# Patient Record
Sex: FEMALE | Race: WHITE | Employment: OTHER | URBAN - NONMETROPOLITAN AREA
[De-identification: names, ages, dates, MRNs, and addresses within clinical notes are randomized per-mention and may not be internally consistent; named-entity substitution may affect disease eponyms.]

---

## 2017-10-26 ENCOUNTER — HOSPITAL ENCOUNTER (OUTPATIENT)
Dept: PHYSICAL THERAPY | Age: 82
Setting detail: THERAPIES SERIES
Discharge: HOME | End: 2017-10-26
Payer: MEDICARE

## 2017-10-26 PROCEDURE — G8987 SELF CARE CURRENT STATUS: HCPCS

## 2017-10-26 PROCEDURE — G8988 SELF CARE GOAL STATUS: HCPCS

## 2017-10-26 PROCEDURE — 97162 PT EVAL MOD COMPLEX 30 MIN: CPT

## 2017-10-26 PROCEDURE — 97140 MANUAL THERAPY 1/> REGIONS: CPT

## 2017-10-27 PROCEDURE — G8988 SELF CARE GOAL STATUS: HCPCS

## 2017-10-27 PROCEDURE — G8987 SELF CARE CURRENT STATUS: HCPCS

## 2017-10-27 NOTE — PROGRESS NOTES
Phone: [address]          Fax: [phone]                      Outpatient Physical Therapy                                                                      Evaluation  Date: 10/27/2017  Patient: Manny Beebe  : 3/6/1932  [facility] #: [id number]  Referring Practitioner: Dr. Nancy Santos     Referral Date : 10/10/17     Diagnosis: R UE lymphedema     Treatment Diagnosis: R UE lymphedema   Onset Date: 10/10/17  PT Insurance Information: Medicare   Total # of Visits Approved: 12   Total # of Visits to Date: 1  No Show: 0  Canceled Appointment: 0     Subjective  Subjective: Pt reports she has breast cancer in her R breast in . PT states that removed 2 lymphnodes at the time. PT reports she slowly started noticing her R arm was getting bigger and started to go to a wound clinic. Pt is wearing a compresson garement and has a pump on it's way. Additional Pertinent Hx: breast cancer 2015, OA,   Pain Screening  Patient Currently in Pain: Denies          Objective     Observation/Palpation  Edema: R hand measurements pinky 4.8cm, 2nd 5.5, 3rd 5.6, 4th 6.2, thumb 5.7 wrist 16.3, 3\" above 19, 6in above 25.4, elbow 27.4, 3\" above 23, armpit 28.5  L hand pinky 4.5, 2nd 5.5, 3rd 5.5, 4th 6.0, thumb 5.8, wrist 15.5, 3\" above 17. 6\" above 21.2, elbow 22.4, 3\" above 25.5, armpit 27.2        Assessment  Body structures, Functions, Activity limitations: Decreased strength, Decreased ROM  Assessment: Pt is an 80year old female that has a history of R sided breast cancer in  with 2 lymphnode removal. Pt shows lymphedema with most significant measure around her elbow. Pt will benefit from the dynamic MDL approach along with compression. Prognosis: Good        Decision Making: Medium Complexity    Patient Education  Pt educated on her POC   Pt verbalized/demonstrated good understanding:     [x] Yes         [] No, pt required further clarification.       [x] Primary

## 2017-10-27 NOTE — PLAN OF CARE
[facility]           Phone: [phone]             Outpatient Physical Therapy  Fax: [phone]                                           Date: 10/27/2017  Patient: Benito Nolasco : 3/6/1932 CSN #: [id number]   Referring Practitioner:  Dr. Edi Blackwood  Referral Date:  10/10/17       [x] Plan of Care   [] Updated Plan of Care    Dates of Service to Include: 10/26/2017 to 17    Diagnosis:  R UE lymphedema     Rehab (Treatment) Diagnosis:  R UE lymphedema              Onset Date:  10/10/17    Attendance  Total # of Visits to Date: 1 No Show: 0 Canceled Appointment: 0    Assessment  Body structures, Functions, Activity limitations: Decreased strength, Decreased ROM  Assessment: Pt is an 80year old female that has a history of R sided breast cancer in  with 2 lymphnode removal. Pt shows lymphedema with most significant measure around her elbow. Pt will benefit from the dynamic MDL approach along with compression.      [x] Primary Impairment :  G Code:    [] Mobility         [] Carry        [] Body Position       [x] Self Care      [] Other:   Functional Impairment Current:  [] 0%    [x] 1-19% [] 20-39% [] 40-59% [] 60-79%    [] 80-99% [] 100%  Functional Impairment Goal:  [x] 0%    [] 1-19% [] 20-39% [] 40-59% [] 60-79%    [] 80-99% [] 100%  G Code Functional Impairment determined by:  [x] Clinical Judgment   [] Outcome Measure:     Goals  Short term goals  Time Frame for Short term goals: 2 weeks  Short term goal 1: Pt will be educated on her PCO and HEP-met  Short term goal 2: Pt will be educated on compression garament wear-met  Long term goals  Time Frame for Long term goals : 4 weeks  Long term goal 1: Pt will be independent with MLD  Long term goal 2: Pt will demonstrate a decrease in lymphedmea by 2cm in her forearm and elbow  Long term goal 3: Pt will be able to maintain lymphedema independently  Long term goal 4: Pt will be fitted for costume garament sleeve     Prognosis  Prognosis: Good    Treatment Plan   Times per week: 4x/wk   Plan weeks: 4 weeks  []HP/CP      []Electrical Stim   []Therapeutic Exercise      []Gait Training  []Aquatics   []Ultrasound         [x]Patient Education/HEP   [x]Manual Therapy  []Traction    []Neuro-obed        []Soft Tissue Mobs            []Home TENS  []Iontophoresis    []Orthotic casting/fitting      [x]MLD           Electronically signed by: Pascual Holden PT, DPT    Date: 10/27/2017      ______________________________________ Date: 10/27/2017   Physician Signature

## 2017-10-30 ENCOUNTER — HOSPITAL ENCOUNTER (OUTPATIENT)
Dept: PHYSICAL THERAPY | Age: 82
Setting detail: THERAPIES SERIES
Discharge: HOME | End: 2017-10-30
Payer: MEDICARE

## 2017-10-30 PROCEDURE — 97140 MANUAL THERAPY 1/> REGIONS: CPT

## 2017-10-30 NOTE — PROGRESS NOTES
Phone: Arleen           Fax: [phone]                           Outpatient Physical Therapy                                                                            Daily Note    Patient: Reyna Geiger : 3/6/1932  CSN #: [id number]   Referring Practitioner:  Dr. Day Jameson     Referral Date : 10/10/17     Date: 10/30/2017    Diagnosis: R UE lymphedema   Treatment Diagnosis: R UE lymphedema     Onset Date: 10/10/17  PT Insurance Information: Medicare   Total # of Visits Approved: 12 Per Physician Order  Total # of Visits to Date: 2  No Show: 0  Canceled Appointment: 0      Pre-Treatment Pain: 0/10  Subjective: Pt reports she has been wearing her sleeve at night and the edema seems to be getting better. Pt brought her daughter to learn the self drainage         Manual:     Soft Tissue Mobalization: MLD      Assessment  Assessment: First session of 40 minutes with MLD and self MLD education. Both patient and daughter was instructed on the technique of self massage. Daughter had a better understanding than patient and stated she would be there to help her. Patient Education  Pt and daughter educated on self MLD  Pt verbalized/demonstrated good understanding:     [] Yes         [] No, pt required further clarification.     Post Treatment Pain:  0/10      Plan  Times per week: 4x/wk   Plan weeks: 4 weeks      Goals  (Total # of Visits to Date: 2)   Short Term Goals - Time Frame for Short term goals: 2 weeks    Short term goal 1: Pt will be educated on her PCO and HEP-met                                        []Met  []Partially met  []Not met   Short term goal 2: Pt will be educated on compression garament wear-met  []Met   []Partially met  []Not met      []Met   []Partially met  []Not met      []Met   []Partially met  []Not met     Long Term Goals - Time Frame for Long term goals : 4 weeks  Long term goal 1: Pt will be independent with MLD []Met  []Partially

## 2017-10-31 ENCOUNTER — HOSPITAL ENCOUNTER (OUTPATIENT)
Dept: PHYSICAL THERAPY | Age: 82
Setting detail: THERAPIES SERIES
Discharge: HOME | End: 2017-10-31
Payer: MEDICARE

## 2017-10-31 PROCEDURE — 97140 MANUAL THERAPY 1/> REGIONS: CPT

## 2017-10-31 NOTE — PROGRESS NOTES
MLD []Met  []Partially met  []Not met   Long term goal 2: Pt will demonstrate a decrease in lymphedmea by 2cm in her forearm and elbow []Met  []Partially met  []Not met   Long term goal 3: Pt will be able to maintain lymphedema independently []Met  []Partially met  []Not met   Long term goal 4: Pt will be fitted for costume garament sleeve  []Met  []Partially met  []Not met     []Met  []Partially met  []Not met       Minutes Tracking:  Time In: 1050  Time Out: [place] 36  Minutes: [address] [facility] DPT, PT Date: 10/31/2017

## 2017-11-01 ENCOUNTER — HOSPITAL ENCOUNTER (OUTPATIENT)
Dept: PHYSICAL THERAPY | Age: 82
Setting detail: THERAPIES SERIES
Discharge: HOME | End: 2017-11-01
Payer: MEDICARE

## 2017-11-01 PROCEDURE — 97140 MANUAL THERAPY 1/> REGIONS: CPT

## 2017-11-01 NOTE — PROGRESS NOTES
Phone: Arleen           Fax: [phone]                           Outpatient Physical Therapy                                                                            Daily Note    Patient: Matilde Mccray : 3/6/1932  [facility] #: [id number]   Referring Practitioner:  Dr. Rachel Liao     Referral Date : 10/10/17     Date: 2017    Diagnosis: R UE lymphedema   Treatment Diagnosis: R UE lymphedema     Onset Date: 10/10/17  PT Insurance Information: Medicare   Total # of Visits Approved: 12 Per Physician Order  Total # of Visits to Date: 4  No Show: 0  Canceled Appointment: 0      Pre-Treatment Pain:  0/10  Subjective: Pt reports she feels that it's looking better. Pt states that her compression pumps are coming in tomorrow. Manual:   Soft Tissue Mobalization: MLD    Assessment  Assessment: Measurements weren't taken today but overall the lymphedema looks like it has decreased especially in her elbow. Pt was instructed and return demonstrated self drainage. Patient Education  Demonstrated self drainage technique   Pt verbalized/demonstrated good understanding:     [x] Yes         [] No, pt required further clarification.     Post Treatment Pain:  0/10      Plan  Times per week: 4x/wk   Plan weeks: 4 weeks      Goals  (Total # of Visits to Date: 4)   Short Term Goals - Time Frame for Short term goals: 2 weeks   Short term goal 1: Pt will be educated on her PCO and HEP-met                                        []Met   []Partially met  []Not met   Short term goal 2: Pt will be educated on compression garament wear-met  []Met   []Partially met  []Not met      []Met   []Partially met  []Not met      []Met   []Partially met  []Not met     Long Term Goals - Time Frame for Long term goals : 4 weeks  Long term goal 1: Pt will be independent with MLD-progressing  []Met  []Partially met  []Not met   Long term goal 2: Pt will demonstrate a decrease in lymphedmea by 2cm

## 2017-11-03 ENCOUNTER — HOSPITAL ENCOUNTER (OUTPATIENT)
Dept: PHYSICAL THERAPY | Age: 82
Setting detail: THERAPIES SERIES
Discharge: HOME | End: 2017-11-03
Payer: MEDICARE

## 2017-11-03 PROCEDURE — 97140 MANUAL THERAPY 1/> REGIONS: CPT

## 2017-11-03 NOTE — PROGRESS NOTES
Phone: Arleen           Fax: [phone]                           Outpatient Physical Therapy                                                                            Daily Note    Patient: May Teixeira : 3/6/1932  [facility] #: [id number]   Referring Practitioner:  Dr. Etienne Coy     Referral Date : 10/10/17     Date: 11/3/2017    Diagnosis: R UE lymphedema   Treatment Diagnosis: R UE lymphedema     Onset Date: 10/10/17  PT Insurance Information: Medicare   Total # of Visits Approved: 12 Per Physician Order  Total # of Visits to Date: 5  No Show: 0  Canceled Appointment: 0      Pre-Treatment Pain: 0/10  Subjective: Pt reports she got her compression pump yesterday and she was able to do it for 2 hours and also attmepted the self MLD      Manual:     Soft Tissue Mobalization: MLD      Assessment  Assessment: Measure are as follows pinky 5.1cm, 2nd finger 5.5, 3rd 5.8, 4th 6.2, thumb 6.0, wrist 16.5, 3\" above 19, 6\" above 24.6, elbow 26.7, 3\"27.8, armpit 29. Pt will be fitted for a compression sleeve the end of next week     Patient Education  Pt educated on compression sleeve fitting   Pt verbalized/demonstrated good understanding:     [x] Yes         [] No, pt required further clarification.     Post Treatment Pain: 0/10      Plan  Times per week: 4x/wk   Plan weeks: 4 weeks      Goals  (Total # of Visits to Date: 5)   Short Term Goals - Time Frame for Short term goals: 2 weeks  Short term goal 1: Pt will be educated on her PCO and HEP-met                                        []Met  []Partially met  []Not met   Short term goal 2: Pt will be educated on compression garament wear-met  []Met  []Partially met  []Not met      []Met   []Partially met  []Not met      []Met   []Partially met  []Not met     Long Term Goals - Time Frame for Long term goals : 4 weeks  Long term goal 1: Pt will be independent with MLD-progressing  []Met  []Partially met  []Not met   Long term goal 2: Pt will demonstrate a decrease in lymphedmea by 2cm in her forearm and elbow []Met  []Partially met  []Not met   Long term goal 3: Pt will be able to maintain lymphedema independently []Met  []Partially met  []Not met   Long term goal 4: Pt will be fitted for costume garament sleeve  []Met  []Partially met  []Not met     []Met  []Partially met  []Not met       Minutes Tracking:  Time In: 1310  Time Out: Kumar 73  Minutes: Hong [address] DPT, PT Date: 11/3/2017

## 2017-11-06 ENCOUNTER — HOSPITAL ENCOUNTER (OUTPATIENT)
Dept: PHYSICAL THERAPY | Age: 82
Setting detail: THERAPIES SERIES
Discharge: HOME | End: 2017-11-06
Payer: MEDICARE

## 2017-11-06 PROCEDURE — 97140 MANUAL THERAPY 1/> REGIONS: CPT

## 2017-11-06 NOTE — PROGRESS NOTES
Phone: Arleen           Fax: [phone]                           Outpatient Physical Therapy                                                                            Daily Note    Patient: Efe Doherty : 3/6/1932  [facility] #: [id number]   Referring Practitioner:  Dr. Criselda Chanel     Referral Date : 10/10/17     Date: 2017    Diagnosis: R UE lymphedema   Treatment Diagnosis: R UE lymphedema     Onset Date: 10/10/17  PT Insurance Information: Medicare   Total # of Visits Approved: 12 Per Physician Order  Total # of Visits to Date: 6  No Show: 0  Canceled Appointment: 0      Pre-Treatment Pain:  0/10  Subjective: Pt reported she used her pump 2x Saturday and 3x . Pt states her shirts and sweaters are starting to fit better. Manual:   Soft Tissue Mobalization: MLD        Assessment  Assessment: Continue with MLd to R UE. Pt has had an overal decrease in extremity size. Pt is independent with her home pump and has been compliant with wearing a generic compression sleeve. Pt will be fitted for a costume sleeve this week. Patient Education  Reviewed self technique   Pt verbalized/demonstrated good understanding:     [x] Yes         [] No, pt required further clarification.     Post Treatment Pain:  0/10      Plan  Times per week: 4x/wk   Plan weeks: 4 weeks      Goals  (Total # of Visits to Date: 6)   Short Term Goals - Time Frame for Short term goals: 2 weeks     Short term goal 1: Pt will be educated on her PCO and HEP-met                                        []Met   []Partially met  []Not met   Short term goal 2: Pt will be educated on compression garament wear-met  []Met   []Partially met  []Not met      []Met   []Partially met  []Not met      []Met   []Partially met  []Not met     Long Term Goals - Time Frame for Long term goals : 4 weeks  Long term goal 1: Pt will be independent with MLD-progressing  []Met  []Partially met  []Not met   Long

## 2017-11-07 ENCOUNTER — HOSPITAL ENCOUNTER (OUTPATIENT)
Dept: PHYSICAL THERAPY | Age: 82
Setting detail: THERAPIES SERIES
Discharge: HOME | End: 2017-11-07
Payer: MEDICARE

## 2017-11-07 ENCOUNTER — APPOINTMENT (OUTPATIENT)
Dept: PHYSICAL THERAPY | Age: 82
End: 2017-11-07
Payer: MEDICARE

## 2017-11-07 PROCEDURE — 97140 MANUAL THERAPY 1/> REGIONS: CPT

## 2017-11-07 NOTE — PROGRESS NOTES
Phone: Arleen           Fax: [phone]                           Outpatient Physical Therapy                                                                            Daily Note    Patient: Manny Beebe : 3/6/1932  [facility] #: [id number]   Referring Practitioner:  Dr. Nancy Santos     Referral Date : 10/10/17     Date: 2017    Diagnosis: R UE lymphedema   Treatment Diagnosis: R UE lymphedema     Onset Date: 10/10/17  PT Insurance Information: Medicare   Total # of Visits Approved: 12 Per Physician Order  Total # of Visits to Date: 7  No Show: 0  Canceled Appointment: 0      Pre-Treatment Pain:  0/10  Subjective: Pt reports she is still having trouble with her self drainage        Manual:   Soft Tissue Mobalization: MLD      Assessment  Body structures, Functions, Activity limitations: Decreased strength, Decreased ROM  Assessment: Modified MLD was performed today due to time constraint. Pt continues to need reinforcement for self MLD    Patient Education  Reviewed POC   Pt verbalized/demonstrated good understanding:     [x] Yes         [] No, pt required further clarification.     Post Treatment Pain: 0/10      Plan  Times per week: 4x/wk   Plan weeks: 4 weeks      Goals  (Total # of Visits to Date: 7)   Short Term Goals - Time Frame for Short term goals: 2 weeks   Short term goal 1: Pt will be educated on her PCO and HEP-met                                        []Met   []Partially met  []Not met   Short term goal 2: Pt will be educated on compression garament wear-met  []Met   []Partially met  []Not met      []Met   []Partially met  []Not met      []Met   []Partially met  []Not met     Long Term Goals - Time Frame for Long term goals : 4 weeks  Long term goal 1: Pt will be independent with MLD-progressing  []Met  []Partially met  []Not met   Long term goal 2: Pt will demonstrate a decrease in lymphedmea by 2cm in her forearm and elbow-progressing []Met  []Partially met  []Not met   Long term goal 3: Pt will be able to maintain lymphedema independently-progressing  []Met  []Partially met  []Not met   Long term goal 4: Pt will be fitted for costume garament sleeve  []Met  []Partially met  []Not met     []Met  []Partially met  []Not met       Minutes Tracking:  Time In: 0730  Time Out: 0800  Minutes: 4100 Sanjay MYERS DPT, PT Date: 11/7/2017

## 2017-11-08 ENCOUNTER — HOSPITAL ENCOUNTER (OUTPATIENT)
Dept: PHYSICAL THERAPY | Age: 82
Setting detail: THERAPIES SERIES
Discharge: HOME | End: 2017-11-08
Payer: MEDICARE

## 2017-11-08 PROCEDURE — 97140 MANUAL THERAPY 1/> REGIONS: CPT

## 2017-11-10 ENCOUNTER — HOSPITAL ENCOUNTER (OUTPATIENT)
Dept: PHYSICAL THERAPY | Age: 82
Setting detail: THERAPIES SERIES
Discharge: HOME | End: 2017-11-10
Payer: MEDICARE

## 2017-11-10 PROCEDURE — 97140 MANUAL THERAPY 1/> REGIONS: CPT

## 2017-11-10 NOTE — PROGRESS NOTES
Phone: Arleen           Fax: [phone]                           Outpatient Physical Therapy                                                                            Daily Note    Patient: May Teixeira : 3/6/1932  [facility] #: [id number]   Referring Practitioner:  Dr. Etienne Coy     Referral Date : 10/10/17     Date: 11/10/2017    Diagnosis: R UE lymphedema   Treatment Diagnosis: R UE lymphedema     Onset Date: 10/10/17  PT Insurance Information: Medicare   Total # of Visits Approved: 12 Per Physician Order  Total # of Visits to Date: 9  No Show: 0  Canceled Appointment: 0      Pre-Treatment Pain:  0/10  Subjective: Pt reported her pump is going well at home         Manual:   Soft Tissue Mobalization: MLD        Assessment  Assessment: Measurements this date wrist 16cm, #\" above 18.6, 6\" above 22.4cm, elbow 26, 3\" above 26.5 armpit 28.8cm     Patient Education  Pt reviewed   Pt verbalized/demonstrated good understanding:     [x] Yes         [] No, pt required further clarification.     Post Treatment Pain:  0/10      Plan  Times per week: 4x/wk   Plan weeks: 4 weeks      Goals  (Total # of Visits to Date: 5)   Short Term Goals - Time Frame for Short term goals: 2 weeks   Short term goal 1: Pt will be educated on her PCO and HEP-met                                        []Met   []Partially met  []Not met   Short term goal 2: Pt will be educated on compression garament wear-met  []Met   []Partially met  []Not met      []Met   []Partially met  []Not met      []Met  []Partially met  []Not met     Long Term Goals - Time Frame for Long term goals : 4 weeks  Long term goal 1: Pt will be independent with MLD-progressing  []Met  []Partially met  []Not met   Long term goal 2: Pt will demonstrate a decrease in lymphedmea by 2cm in her forearm and elbow-progressing  []Met  []Partially met  []Not met   Long term goal 3: Pt will be able to maintain lymphedema independently-progressing  []Met  []Partially met  []Not met   Long term goal 4: Pt will be fitted for costume garament sleeve  []Met  []Partially met  []Not met     []Met  []Partially met  []Not met       Minutes Tracking:  Time In: 1400  Time Out: [address]  Minutes: [address] [facility] DPT, PT Date: 11/10/2017

## 2017-11-13 ENCOUNTER — HOSPITAL ENCOUNTER (OUTPATIENT)
Dept: PHYSICAL THERAPY | Age: 82
Setting detail: THERAPIES SERIES
Discharge: HOME | End: 2017-11-13
Payer: MEDICARE

## 2017-11-14 ENCOUNTER — HOSPITAL ENCOUNTER (OUTPATIENT)
Dept: PHYSICAL THERAPY | Age: 82
Setting detail: THERAPIES SERIES
Discharge: HOME | End: 2017-11-14
Payer: MEDICARE

## 2017-11-14 PROCEDURE — 97140 MANUAL THERAPY 1/> REGIONS: CPT

## 2017-11-14 PROCEDURE — G8988 SELF CARE GOAL STATUS: HCPCS

## 2017-11-14 PROCEDURE — G8987 SELF CARE CURRENT STATUS: HCPCS

## 2017-11-14 NOTE — PROGRESS NOTES
Phone: Arleen           Fax: [phone]                           Outpatient Physical Therapy                                                                            Daily Note    Patient: Matilde Mccray : 3/6/1932  [facility] #: [id number]   Referring Practitioner:  Dr. Rachel Liao     Referral Date : 10/10/17     Date: 2017    Diagnosis: R UE lymphedema   Treatment Diagnosis: R UE lymphedema     Onset Date: 10/10/17  PT Insurance Information: Medicare   Total # of Visits Approved: 12 Per Physician Order  Total # of Visits to Date: 10  No Show: 0  Canceled Appointment: 0      Pre-Treatment Pain:  0/10  Subjective: Pt reported she saw the wound care doctor today and he released her and stated her arm was looking good        Manual:      Soft Tissue Mobalization: MLD        Assessment  Assessment: Pt was released fro her wound car doctor. Pt will continue with MLD until plateau. Measurements wrist 16.6, 3\" above 18.5, 6\" above 22.5, elbow 25.5, 3\" above 26.5, armpit 27.8     Patient Education  PT watched and reviewed technique of self MLD  Pt verbalized/demonstrated good understanding:     [x] Yes         [] No, pt required further clarification.     Post Treatment Pain:  0/10      Plan  Times per week: 4x/wk   Plan weeks: 4 weeks      Goals  (Total # of Visits to Date: 8)   Short Term Goals - Time Frame for Short term goals: 2 weeks    Short term goal 1: Pt will be educated on her PCO and HEP-met                                        []Met   []Partially met  []Not met   Short term goal 2: Pt will be educated on compression garament wear-met  []Met   []Partially met  []Not met      []Met   []Partially met  []Not met      []Met   []Partially met  []Not met     Long Term Goals - Time Frame for Long term goals : 4 weeks  Long term goal 1: Pt will be independent with MLD-progressing  []Met  []Partially met  []Not met   Long term goal 2: Pt will demonstrate a

## 2017-11-15 ENCOUNTER — HOSPITAL ENCOUNTER (OUTPATIENT)
Dept: PHYSICAL THERAPY | Age: 82
Setting detail: THERAPIES SERIES
Discharge: HOME | End: 2017-11-15
Payer: MEDICARE

## 2017-11-15 PROCEDURE — 97140 MANUAL THERAPY 1/> REGIONS: CPT

## 2017-11-15 NOTE — PROGRESS NOTES
Phone: Arleen           Fax: [phone]                           Outpatient Physical Therapy                                                                            Daily Note    Patient: Sanjay Rizvi : 3/6/1932  [facility] #: [id number]   Referring Practitioner:  Dr. Brittnee Matt     Referral Date : 10/10/17     Date: 11/15/2017    Diagnosis: R UE lymphedema   Treatment Diagnosis: R UE lymphedema     Onset Date: 10/10/17  PT Insurance Information: Medicare   Total # of Visits Approved: 12 Per Physician Order  Total # of Visits to Date: 11  No Show: 0  Canceled Appointment: 0      Pre-Treatment Pain:  0/10  Subjective: No complaints         Manual:     Soft Tissue Mobalization: MLD      Assessment  Assessment: Pt will be fitted for a compression sleeve next visit and continue 2x/wk until sleeve arrives     Patient Education  Sleeve fitting   Pt verbalized/demonstrated good understanding:     [x] Yes         [] No, pt required further clarification.     Post Treatment Pain:  0/10      Plan  Times per week: 4x/wk   Plan weeks: 4 weeks      Goals  (Total # of Visits to Date: 6)   Short Term Goals - Time Frame for Short term goals: 2 weeks    Short term goal 1: Pt will be educated on her PCO and HEP-met                                        []Met  []Partially met  []Not met   Short term goal 2: Pt will be educated on compression garament wear-met  []Met   []Partially met  []Not met      []Met   []Partially met  []Not met      []Met   []Partially met  []Not met     Long Term Goals - Time Frame for Long term goals : 4 weeks  Long term goal 1: Pt will be independent with MLD-progressing  []Met  []Partially met  []Not met   Long term goal 2: Pt will demonstrate a decrease in lymphedmea by 2cm in her forearm and elbow-progressing  []Met  []Partially met  []Not met   Long term goal 3: Pt will be able to maintain lymphedema independently-progressing  []Met  []Partially met  []Not met   Long term goal 4: Pt will be fitted for costume garament sleeve  []Met  []Partially met  []Not met     []Met  []Partially met  []Not met       Minutes Tracking:  Time In: 1340  Time Out: [address]  Minutes: 601 [facility] DPT, PT Date: 11/15/2017

## 2017-11-17 ENCOUNTER — HOSPITAL ENCOUNTER (OUTPATIENT)
Dept: PHYSICAL THERAPY | Age: 82
Setting detail: THERAPIES SERIES
Discharge: HOME | End: 2017-11-17
Payer: MEDICARE

## 2017-11-17 PROCEDURE — 97140 MANUAL THERAPY 1/> REGIONS: CPT

## 2017-11-17 NOTE — PLAN OF CARE
for costume garament sleeve-met    Prognosis  Prognosis: Good    Treatment Plan   Times per week: 2x/wk  Plan weeks: 4 weeks  []HP/CP      []Electrical Stim   []Therapeutic Exercise      []Gait Training  []Aquatics   []Ultrasound         [x]Patient Education/HEP   [x]Manual Therapy  []Traction    []Neuro-obed        []Soft Tissue Mobs            []Home TENS  []Iontophoresis    []Orthotic casting/fitting      []Dry Needling             Electronically signed by: Danial Casper PT, DPT    Date: 11/17/2017      ______________________________________ Date: 11/17/2017   Physician Signature

## 2017-11-20 ENCOUNTER — HOSPITAL ENCOUNTER (OUTPATIENT)
Dept: PHYSICAL THERAPY | Age: 82
Setting detail: THERAPIES SERIES
Discharge: HOME | End: 2017-11-20
Payer: MEDICARE

## 2017-11-20 PROCEDURE — 97140 MANUAL THERAPY 1/> REGIONS: CPT

## 2017-11-20 NOTE — PROGRESS NOTES
lymphedema independently-progressing  []Met  []Partially met  []Not met   Long term goal 4: Pt will be fitted for costume garament sleeve-met []Met  []Partially met  []Not met     []Met  []Partially met  []Not met       Minutes Tracking:  Time In: 1000  Time Out: [place]  Minutes: 25    Marina MARIAT,PT Date: 11/20/2017

## 2017-11-22 ENCOUNTER — HOSPITAL ENCOUNTER (OUTPATIENT)
Dept: PHYSICAL THERAPY | Age: 82
Setting detail: THERAPIES SERIES
Discharge: HOME | End: 2017-11-22
Payer: MEDICARE

## 2017-11-22 PROCEDURE — 97140 MANUAL THERAPY 1/> REGIONS: CPT

## 2017-11-22 NOTE — PROGRESS NOTES
met  []Not met   Long term goal 4: Pt will be fitted for costume garament sleeve-met []Met  []Partially met  []Not met     []Met  []Partially met  []Not met       Minutes Tracking:             Annabel Mata DPT, PT Date: 11/22/2017

## 2017-11-27 ENCOUNTER — HOSPITAL ENCOUNTER (OUTPATIENT)
Dept: PHYSICAL THERAPY | Age: 82
Setting detail: THERAPIES SERIES
Discharge: HOME | End: 2017-11-27
Payer: MEDICARE

## 2017-11-27 PROCEDURE — 97140 MANUAL THERAPY 1/> REGIONS: CPT

## 2017-11-27 NOTE — PROGRESS NOTES
Phone: Arleen           Fax: [phone]                           Outpatient Physical Therapy                                                                            Daily Note    Patient: Joseph Brower : 3/6/1932  [facility] #: [id number]   Referring Practitioner:  Dr. Sasha Carranza     Referral Date : 10/10/17     Date: 2017    Diagnosis: R UE lymphedema   Treatment Diagnosis: R UE lymphedema     Onset Date: 10/10/17  PT Insurance Information: Medicare   Total # of Visits Approved: 20 Per Physician Order  Total # of Visits to Date: 15  No Show: 0  Canceled Appointment: 0      Pre-Treatment Pain:  0/10  Subjective: Pt has no complaints         Manual:      Soft Tissue Mobalization: MLD      Assessment  Assessment: Measurements today pinky 4.6, 2nd 5.2, 3rd 5.5, 4th 5.8, thumb 5.8, wrist 16.2, 3\" above 18.6, 6\" above 23, elbow 24.6, 3\" above 26, armpit 27. Pt had mild/moderate hardening of lymphedema to forearm. PT worked area and skin became less taught after MLD. Pt educated on using her compression pump 3x/today     Patient Education  Pt educated on increasing compression pump use  Pt verbalized/demonstrated good understanding:     [x] Yes         [] No, pt required further clarification.     Post Treatment Pain:  0/10      Plan  Times per week: 2x/wk  Plan weeks: 4 weeks      Goals  (Total # of Visits to Date: 13)   Short Term Goals - Time Frame for Short term goals: 2 weeks     Short term goal 1: Pt will be educated on her PCO and HEP-met                                        []Met   []Partially met  []Not met   Short term goal 2: Pt will be educated on compression garament wear-met  []Met   []Partially met  []Not met      []Met   []Partially met  []Not met      []Met   []Partially met  []Not met     Long Term Goals - Time Frame for Long term goals : 4 weeks  Long term goal 1: Pt will be independent with MLD-progressing  []Met  []Partially met  []Not met Long term goal 2: Pt will demonstrate a decrease in lymphedmea by 2cm in her forearm and elbow-progressing  []Met  []Partially met  []Not met   Long term goal 3: Pt will be able to maintain lymphedema independently-progressing  []Met  []Partially met  []Not met   Long term goal 4: Pt will be fitted for costume garament sleeve-met []Met  []Partially met  []Not met     []Met  []Partially met  []Not met       Minutes Tracking:  Time In: 1000  Time Out: [address] [facility]  Minutes: [address] [facility] DPT, PT Date: 11/27/2017

## 2017-11-29 ENCOUNTER — HOSPITAL ENCOUNTER (OUTPATIENT)
Dept: PHYSICAL THERAPY | Age: 82
Setting detail: THERAPIES SERIES
Discharge: HOME | End: 2017-11-29
Payer: MEDICARE

## 2017-11-29 PROCEDURE — 97140 MANUAL THERAPY 1/> REGIONS: CPT

## 2017-11-29 NOTE — PROGRESS NOTES
independently-met []Met  []Partially met  []Not met   Long term goal 4: Pt will be fitted for costume garament sleeve-met []Met  []Partially met  []Not met     []Met  []Partially met  []Not met       Minutes Tracking:  Time In: 7891  Time Out: [address]  Minutes: [address] DPT, PT Date: 11/29/2017

## 2017-12-08 ENCOUNTER — HOSPITAL ENCOUNTER (OUTPATIENT)
Dept: PHYSICAL THERAPY | Age: 82
Setting detail: THERAPIES SERIES
Discharge: HOME | End: 2017-12-08
Payer: MEDICARE

## 2017-12-08 PROCEDURE — G8989 SELF CARE D/C STATUS: HCPCS

## 2017-12-08 PROCEDURE — G8987 SELF CARE CURRENT STATUS: HCPCS

## 2017-12-08 PROCEDURE — G8988 SELF CARE GOAL STATUS: HCPCS

## 2017-12-08 PROCEDURE — 97110 THERAPEUTIC EXERCISES: CPT

## 2017-12-08 NOTE — PROGRESS NOTES
Phone: Arleen           Fax: [phone]                           Outpatient Physical Therapy                                                                            Daily Note    Patient: Skip Muñoz : 3/6/1932  CSN #: [id number]   Referring Practitioner:  Dr. Hammond Cure     Referral Date : 10/10/17     Date: 2017    Diagnosis: R UE lymphedema   Treatment Diagnosis: R UE lymphedema     Onset Date: 10/10/17  PT Insurance Information: Medicare   Total # of Visits Approved: 20 Per Physician Order  Total # of Visits to Date: 17  No Show: 0  Canceled Appointment: 0      Pre-Treatment Pain:  0/10  Subjective: Pt reports she has been doing well withher pump at home and was able to  her compression garment     Exercises:     Exercise 2: helped don and off compression garment        Assessment  Assessment: Pt had a slight increase in in measurements today's date due to pt not using her compression pump for two days while on her trip. Pt was educated on wearing the compression pump 2x/daily. PT helped patient don and off compression garment. Pt educated on use and how to appropriate take care of compression garment. Pt's chart will be held in case any issues come about in the next 2 weeks. Patient Education  Pt educated on compression garment wear and appropriate care   Pt verbalized/demonstrated good understanding:     [x] Yes         [] No, pt required further clarification.     Post Treatment Pain:  0/10      Plan  Times per week: 2x/wk  Plan weeks: 4 weeks      Goals  (Total # of Visits to Date: 16)   Short Term Goals - Time Frame for Short term goals: 2 weeks    Short term goal 1: Pt will be educated on her PCO and HEP-met                                        []Met   []Partially met  []Not met   Short term goal 2: Pt will be educated on compression garament wear-met  []Met   []Partially met  []Not met      []Met   []Partially met  []Not met []Met   []Partially met  []Not met     Long Term Goals - Time Frame for Long term goals : 4 weeks  Long term goal 1: Pt will be independent with MLD-progressing  []Met  []Partially met  []Not met   Long term goal 2: Pt will demonstrate a decrease in lymphedmea by 2cm in her forearm and elbow-met []Met  []Partially met  []Not met   Long term goal 3: Pt will be able to maintain lymphedema independently-met []Met  []Partially met  []Not met   Long term goal 4: Pt will be fitted for costume garament sleeve-met []Met  []Partially met  []Not met     []Met  []Partially met  []Not met       Minutes Tracking:  Time In: 1050  Time Out: [address]  Minutes: Kathleen DAHL, PT Date: 12/8/2017

## 2018-06-13 NOTE — DISCHARGE SUMMARY
Through/Attendance                  [x] Optimal Function Achieved     [] Patient Discharged Self    [] Hospitalization                         [] Physician discharge      Thank you for this referral      Danial Casper PT, DPT                    Date: 6/13/2018

## 2021-04-15 PROBLEM — M17.11 PRIMARY OSTEOARTHRITIS OF RIGHT KNEE: Status: ACTIVE | Noted: 2021-04-15

## 2024-11-06 NOTE — PROGRESS NOTES
Jeanette Rose  Had a MLD massage based on Yasir Liu PT's direction. No charge today. Capillary refill less/equal to 2 seconds

## 2025-07-02 ENCOUNTER — APPOINTMENT (OUTPATIENT)
Dept: CT IMAGING | Age: 89
End: 2025-07-02
Payer: MEDICARE

## 2025-07-02 ENCOUNTER — HOSPITAL ENCOUNTER (INPATIENT)
Age: 89
LOS: 3 days | Discharge: HOME | End: 2025-07-05
Admitting: INTERNAL MEDICINE
Payer: MEDICARE

## 2025-07-02 ENCOUNTER — APPOINTMENT (OUTPATIENT)
Dept: GENERAL RADIOLOGY | Age: 89
End: 2025-07-02
Payer: MEDICARE

## 2025-07-02 DIAGNOSIS — E83.42 HYPOMAGNESEMIA: ICD-10-CM

## 2025-07-02 DIAGNOSIS — J18.9 COMMUNITY ACQUIRED PNEUMONIA OF RIGHT LOWER LOBE OF LUNG: ICD-10-CM

## 2025-07-02 DIAGNOSIS — I47.29 NSVT (NONSUSTAINED VENTRICULAR TACHYCARDIA) (HCC): ICD-10-CM

## 2025-07-02 DIAGNOSIS — R01.1 HEART MURMUR: ICD-10-CM

## 2025-07-02 DIAGNOSIS — E86.0 DEHYDRATION: ICD-10-CM

## 2025-07-02 DIAGNOSIS — R06.02 SHORTNESS OF BREATH: ICD-10-CM

## 2025-07-02 DIAGNOSIS — R53.1 GENERALIZED WEAKNESS: Primary | ICD-10-CM

## 2025-07-02 PROBLEM — F03.90 DEMENTIA (HCC): Status: ACTIVE | Noted: 2025-07-02

## 2025-07-02 LAB
ALBUMIN SERPL-MCNC: 2.9 G/DL (ref 3.5–5.2)
ALBUMIN/GLOB SERPL: 1.6 {RATIO} (ref 1–2.5)
ALP SERPL-CCNC: 44 U/L (ref 35–104)
ALT SERPL-CCNC: 6 U/L (ref 10–35)
ANION GAP SERPL CALCULATED.3IONS-SCNC: 9 MMOL/L (ref 9–16)
AST SERPL-CCNC: 17 U/L (ref 10–35)
B PARAP IS1001 DNA NPH QL NAA+NON-PROBE: NOT DETECTED
B PERT DNA SPEC QL NAA+PROBE: NOT DETECTED
BASOPHILS # BLD: 0.04 K/UL (ref 0–0.2)
BASOPHILS NFR BLD: 1 % (ref 0–2)
BILIRUB SERPL-MCNC: <0.2 MG/DL (ref 0–1.2)
BILIRUB UR QL STRIP: NEGATIVE
BUN SERPL-MCNC: 15 MG/DL (ref 8–23)
BUN/CREAT SERPL: 15 (ref 9–20)
C PNEUM DNA NPH QL NAA+NON-PROBE: NOT DETECTED
CALCIUM SERPL-MCNC: 7.9 MG/DL (ref 8.6–10.4)
CHLORIDE SERPL-SCNC: 109 MMOL/L (ref 98–107)
CLARITY UR: CLEAR
CO2 SERPL-SCNC: 22 MMOL/L (ref 20–31)
COLOR UR: YELLOW
CREAT SERPL-MCNC: 1 MG/DL (ref 0.5–0.9)
CRP SERPL HS-MCNC: <3 MG/L (ref 0–5)
EKG ATRIAL RATE: 57 BPM
EKG P AXIS: 23 DEGREES
EKG P-R INTERVAL: 208 MS
EKG Q-T INTERVAL: 470 MS
EKG QRS DURATION: 108 MS
EKG QTC CALCULATION (BAZETT): 457 MS
EKG R AXIS: -67 DEGREES
EKG T AXIS: 243 DEGREES
EKG VENTRICULAR RATE: 57 BPM
EOSINOPHIL # BLD: 0.09 K/UL (ref 0–0.44)
EOSINOPHILS RELATIVE PERCENT: 2 % (ref 1–4)
EPI CELLS #/AREA URNS HPF: ABNORMAL /HPF (ref 0–25)
ERYTHROCYTE [DISTWIDTH] IN BLOOD BY AUTOMATED COUNT: 13.3 % (ref 11.8–14.4)
FLUAV RNA NPH QL NAA+NON-PROBE: NOT DETECTED
FLUBV RNA NPH QL NAA+NON-PROBE: NOT DETECTED
GFR, ESTIMATED: 51 ML/MIN/1.73M2
GLUCOSE SERPL-MCNC: 80 MG/DL (ref 74–99)
GLUCOSE UR STRIP-MCNC: NEGATIVE MG/DL
HADV DNA NPH QL NAA+NON-PROBE: NOT DETECTED
HCOV 229E RNA NPH QL NAA+NON-PROBE: NOT DETECTED
HCOV HKU1 RNA NPH QL NAA+NON-PROBE: NOT DETECTED
HCOV NL63 RNA NPH QL NAA+NON-PROBE: NOT DETECTED
HCOV OC43 RNA NPH QL NAA+NON-PROBE: NOT DETECTED
HCT VFR BLD AUTO: 29.6 % (ref 36.3–47.1)
HGB BLD-MCNC: 9.6 G/DL (ref 11.9–15.1)
HGB UR QL STRIP.AUTO: NEGATIVE
HMPV RNA NPH QL NAA+NON-PROBE: NOT DETECTED
HPIV1 RNA NPH QL NAA+NON-PROBE: NOT DETECTED
HPIV2 RNA NPH QL NAA+NON-PROBE: NOT DETECTED
HPIV3 RNA NPH QL NAA+NON-PROBE: NOT DETECTED
HPIV4 RNA NPH QL NAA+NON-PROBE: NOT DETECTED
IMM GRANULOCYTES # BLD AUTO: <0.03 K/UL (ref 0–0.3)
IMM GRANULOCYTES NFR BLD: 0 %
KETONES UR STRIP-MCNC: NEGATIVE MG/DL
LACTATE BLDV-SCNC: 0.9 MMOL/L (ref 0.5–1.9)
LEUKOCYTE ESTERASE UR QL STRIP: NEGATIVE
LYMPHOCYTES NFR BLD: 1.18 K/UL (ref 1.1–3.7)
LYMPHOCYTES RELATIVE PERCENT: 25 % (ref 24–43)
M PNEUMO DNA NPH QL NAA+NON-PROBE: NOT DETECTED
MAGNESIUM SERPL-MCNC: 0.8 MG/DL (ref 1.7–2.3)
MAGNESIUM SERPL-MCNC: 3.5 MG/DL (ref 1.7–2.3)
MCH RBC QN AUTO: 33.2 PG (ref 25.2–33.5)
MCHC RBC AUTO-ENTMCNC: 32.4 G/DL (ref 28.4–34.8)
MCV RBC AUTO: 102.4 FL (ref 82.6–102.9)
MONOCYTES NFR BLD: 0.49 K/UL (ref 0.1–1.2)
MONOCYTES NFR BLD: 10 % (ref 3–12)
MUCOUS THREADS URNS QL MICRO: ABNORMAL
NEUTROPHILS NFR BLD: 62 % (ref 36–65)
NEUTS SEG NFR BLD: 2.95 K/UL (ref 1.5–8.1)
NITRITE UR QL STRIP: NEGATIVE
NRBC BLD-RTO: 0 PER 100 WBC
PH UR STRIP: 6.5 [PH] (ref 5–9)
PLATELET # BLD AUTO: 203 K/UL (ref 138–453)
PMV BLD AUTO: 11.1 FL (ref 8.1–13.5)
POTASSIUM SERPL-SCNC: 4.5 MMOL/L (ref 3.7–5.3)
PROT SERPL-MCNC: 4.6 G/DL (ref 6.6–8.7)
PROT UR STRIP-MCNC: NEGATIVE MG/DL
RBC # BLD AUTO: 2.89 M/UL (ref 3.95–5.11)
RBC #/AREA URNS HPF: ABNORMAL /HPF (ref 0–2)
RSV RNA NPH QL NAA+NON-PROBE: NOT DETECTED
RV+EV RNA NPH QL NAA+NON-PROBE: NOT DETECTED
SARS-COV-2 RNA NPH QL NAA+NON-PROBE: NOT DETECTED
SODIUM SERPL-SCNC: 140 MMOL/L (ref 136–145)
SP GR UR STRIP: 1.01 (ref 1.01–1.02)
SPECIMEN DESCRIPTION: NORMAL
TROPONIN I SERPL HS-MCNC: 11 NG/L (ref 0–14)
TROPONIN I SERPL HS-MCNC: 23 NG/L (ref 0–14)
UROBILINOGEN UR STRIP-ACNC: NORMAL EU/DL (ref 0–1)
WBC #/AREA URNS HPF: ABNORMAL /HPF (ref 0–5)
WBC OTHER # BLD: 4.8 K/UL (ref 3.5–11.3)

## 2025-07-02 PROCEDURE — 87040 BLOOD CULTURE FOR BACTERIA: CPT

## 2025-07-02 PROCEDURE — 71045 X-RAY EXAM CHEST 1 VIEW: CPT

## 2025-07-02 PROCEDURE — 6360000002 HC RX W HCPCS: Performed by: NURSE PRACTITIONER

## 2025-07-02 PROCEDURE — 70450 CT HEAD/BRAIN W/O DYE: CPT

## 2025-07-02 PROCEDURE — 80053 COMPREHEN METABOLIC PANEL: CPT

## 2025-07-02 PROCEDURE — 94640 AIRWAY INHALATION TREATMENT: CPT

## 2025-07-02 PROCEDURE — 2580000003 HC RX 258

## 2025-07-02 PROCEDURE — 0202U NFCT DS 22 TRGT SARS-COV-2: CPT

## 2025-07-02 PROCEDURE — 94669 MECHANICAL CHEST WALL OSCILL: CPT

## 2025-07-02 PROCEDURE — 96361 HYDRATE IV INFUSION ADD-ON: CPT

## 2025-07-02 PROCEDURE — 85025 COMPLETE CBC W/AUTO DIFF WBC: CPT

## 2025-07-02 PROCEDURE — 99285 EMERGENCY DEPT VISIT HI MDM: CPT

## 2025-07-02 PROCEDURE — 83735 ASSAY OF MAGNESIUM: CPT

## 2025-07-02 PROCEDURE — 74177 CT ABD & PELVIS W/CONTRAST: CPT

## 2025-07-02 PROCEDURE — 86140 C-REACTIVE PROTEIN: CPT

## 2025-07-02 PROCEDURE — 1200000000 HC SEMI PRIVATE

## 2025-07-02 PROCEDURE — 93005 ELECTROCARDIOGRAM TRACING: CPT

## 2025-07-02 PROCEDURE — 84145 PROCALCITONIN (PCT): CPT

## 2025-07-02 PROCEDURE — 84484 ASSAY OF TROPONIN QUANT: CPT

## 2025-07-02 PROCEDURE — 93010 ELECTROCARDIOGRAM REPORT: CPT | Performed by: INTERNAL MEDICINE

## 2025-07-02 PROCEDURE — 94760 N-INVAS EAR/PLS OXIMETRY 1: CPT

## 2025-07-02 PROCEDURE — 6370000000 HC RX 637 (ALT 250 FOR IP): Performed by: INTERNAL MEDICINE

## 2025-07-02 PROCEDURE — 6360000002 HC RX W HCPCS

## 2025-07-02 PROCEDURE — 36415 COLL VENOUS BLD VENIPUNCTURE: CPT

## 2025-07-02 PROCEDURE — 81001 URINALYSIS AUTO W/SCOPE: CPT

## 2025-07-02 PROCEDURE — 71260 CT THORAX DX C+: CPT

## 2025-07-02 PROCEDURE — 2500000003 HC RX 250 WO HCPCS: Performed by: NURSE PRACTITIONER

## 2025-07-02 PROCEDURE — 6370000000 HC RX 637 (ALT 250 FOR IP)

## 2025-07-02 PROCEDURE — 83605 ASSAY OF LACTIC ACID: CPT

## 2025-07-02 PROCEDURE — 94664 DEMO&/EVAL PT USE INHALER: CPT

## 2025-07-02 PROCEDURE — 2580000003 HC RX 258: Performed by: NURSE PRACTITIONER

## 2025-07-02 PROCEDURE — 96374 THER/PROPH/DIAG INJ IV PUSH: CPT

## 2025-07-02 PROCEDURE — 6360000004 HC RX CONTRAST MEDICATION

## 2025-07-02 PROCEDURE — 6370000000 HC RX 637 (ALT 250 FOR IP): Performed by: NURSE PRACTITIONER

## 2025-07-02 RX ORDER — SODIUM CHLORIDE 0.9 % (FLUSH) 0.9 %
5-40 SYRINGE (ML) INJECTION PRN
Status: DISCONTINUED | OUTPATIENT
Start: 2025-07-02 | End: 2025-07-05 | Stop reason: HOSPADM

## 2025-07-02 RX ORDER — ENOXAPARIN SODIUM 100 MG/ML
30 INJECTION SUBCUTANEOUS DAILY
Status: DISCONTINUED | OUTPATIENT
Start: 2025-07-02 | End: 2025-07-05 | Stop reason: HOSPADM

## 2025-07-02 RX ORDER — ONDANSETRON 2 MG/ML
4 INJECTION INTRAMUSCULAR; INTRAVENOUS EVERY 6 HOURS PRN
Status: DISCONTINUED | OUTPATIENT
Start: 2025-07-02 | End: 2025-07-05 | Stop reason: HOSPADM

## 2025-07-02 RX ORDER — MAGNESIUM SULFATE IN WATER 40 MG/ML
2000 INJECTION, SOLUTION INTRAVENOUS ONCE
Status: COMPLETED | OUTPATIENT
Start: 2025-07-02 | End: 2025-07-02

## 2025-07-02 RX ORDER — ATORVASTATIN CALCIUM 10 MG/1
10 TABLET, FILM COATED ORAL DAILY
Status: DISCONTINUED | OUTPATIENT
Start: 2025-07-02 | End: 2025-07-05 | Stop reason: HOSPADM

## 2025-07-02 RX ORDER — ASPIRIN 325 MG
325 TABLET ORAL DAILY
Status: DISCONTINUED | OUTPATIENT
Start: 2025-07-03 | End: 2025-07-05 | Stop reason: HOSPADM

## 2025-07-02 RX ORDER — ACETAMINOPHEN 500 MG
500 TABLET ORAL EVERY 6 HOURS PRN
Status: DISCONTINUED | OUTPATIENT
Start: 2025-07-02 | End: 2025-07-05 | Stop reason: HOSPADM

## 2025-07-02 RX ORDER — LATANOPROST 50 UG/ML
1 SOLUTION/ DROPS OPHTHALMIC DAILY
Status: DISCONTINUED | OUTPATIENT
Start: 2025-07-03 | End: 2025-07-05 | Stop reason: HOSPADM

## 2025-07-02 RX ORDER — SODIUM CHLORIDE 0.9 % (FLUSH) 0.9 %
5-40 SYRINGE (ML) INJECTION EVERY 12 HOURS SCHEDULED
Status: DISCONTINUED | OUTPATIENT
Start: 2025-07-02 | End: 2025-07-05 | Stop reason: HOSPADM

## 2025-07-02 RX ORDER — IPRATROPIUM BROMIDE AND ALBUTEROL SULFATE 2.5; .5 MG/3ML; MG/3ML
1 SOLUTION RESPIRATORY (INHALATION) 3 TIMES DAILY
Status: DISCONTINUED | OUTPATIENT
Start: 2025-07-02 | End: 2025-07-05 | Stop reason: HOSPADM

## 2025-07-02 RX ORDER — AMLODIPINE BESYLATE 2.5 MG/1
2.5 TABLET ORAL DAILY
Status: DISCONTINUED | OUTPATIENT
Start: 2025-07-03 | End: 2025-07-03

## 2025-07-02 RX ORDER — MORPHINE SULFATE 2 MG/ML
1 INJECTION, SOLUTION INTRAMUSCULAR; INTRAVENOUS ONCE
Status: COMPLETED | OUTPATIENT
Start: 2025-07-02 | End: 2025-07-02

## 2025-07-02 RX ORDER — HYDROXYCHLOROQUINE SULFATE 200 MG/1
200 TABLET, FILM COATED ORAL DAILY
Status: DISCONTINUED | OUTPATIENT
Start: 2025-07-03 | End: 2025-07-05 | Stop reason: HOSPADM

## 2025-07-02 RX ORDER — LEVOTHYROXINE SODIUM 75 UG/1
75 TABLET ORAL DAILY
Status: DISCONTINUED | OUTPATIENT
Start: 2025-07-03 | End: 2025-07-05 | Stop reason: HOSPADM

## 2025-07-02 RX ORDER — IOPAMIDOL 755 MG/ML
75 INJECTION, SOLUTION INTRAVASCULAR
Status: COMPLETED | OUTPATIENT
Start: 2025-07-02 | End: 2025-07-02

## 2025-07-02 RX ORDER — QUETIAPINE FUMARATE 25 MG/1
25 TABLET, FILM COATED ORAL NIGHTLY
COMMUNITY

## 2025-07-02 RX ORDER — DOXYCYCLINE 100 MG/1
100 CAPSULE ORAL EVERY 12 HOURS SCHEDULED
Status: DISCONTINUED | OUTPATIENT
Start: 2025-07-02 | End: 2025-07-05 | Stop reason: HOSPADM

## 2025-07-02 RX ORDER — 0.9 % SODIUM CHLORIDE 0.9 %
1000 INTRAVENOUS SOLUTION INTRAVENOUS ONCE
Status: COMPLETED | OUTPATIENT
Start: 2025-07-02 | End: 2025-07-02

## 2025-07-02 RX ORDER — SODIUM CHLORIDE 9 MG/ML
INJECTION, SOLUTION INTRAVENOUS CONTINUOUS
Status: DISCONTINUED | OUTPATIENT
Start: 2025-07-02 | End: 2025-07-03

## 2025-07-02 RX ORDER — LEVOTHYROXINE SODIUM 75 UG/1
75 TABLET ORAL DAILY
COMMUNITY

## 2025-07-02 RX ORDER — SODIUM CHLORIDE 9 MG/ML
INJECTION, SOLUTION INTRAVENOUS PRN
Status: DISCONTINUED | OUTPATIENT
Start: 2025-07-02 | End: 2025-07-05 | Stop reason: HOSPADM

## 2025-07-02 RX ORDER — TIMOLOL MALEATE 2.5 MG/ML
1 SOLUTION/ DROPS OPHTHALMIC DAILY
Status: DISCONTINUED | OUTPATIENT
Start: 2025-07-03 | End: 2025-07-05 | Stop reason: HOSPADM

## 2025-07-02 RX ORDER — PANTOPRAZOLE SODIUM 40 MG/1
40 TABLET, DELAYED RELEASE ORAL
Status: DISCONTINUED | OUTPATIENT
Start: 2025-07-03 | End: 2025-07-05 | Stop reason: HOSPADM

## 2025-07-02 RX ORDER — IPRATROPIUM BROMIDE AND ALBUTEROL SULFATE 2.5; .5 MG/3ML; MG/3ML
1 SOLUTION RESPIRATORY (INHALATION) EVERY 4 HOURS PRN
Status: DISCONTINUED | OUTPATIENT
Start: 2025-07-02 | End: 2025-07-02

## 2025-07-02 RX ORDER — QUETIAPINE FUMARATE 25 MG/1
25 TABLET, FILM COATED ORAL NIGHTLY
Status: DISCONTINUED | OUTPATIENT
Start: 2025-07-02 | End: 2025-07-05 | Stop reason: HOSPADM

## 2025-07-02 RX ORDER — ONDANSETRON 4 MG/1
4 TABLET, ORALLY DISINTEGRATING ORAL EVERY 8 HOURS PRN
Status: DISCONTINUED | OUTPATIENT
Start: 2025-07-02 | End: 2025-07-05 | Stop reason: HOSPADM

## 2025-07-02 RX ORDER — TRAMADOL HYDROCHLORIDE 50 MG/1
50 TABLET ORAL ONCE
Status: COMPLETED | OUTPATIENT
Start: 2025-07-02 | End: 2025-07-02

## 2025-07-02 RX ORDER — ALBUTEROL SULFATE 0.83 MG/ML
2.5 SOLUTION RESPIRATORY (INHALATION) EVERY 4 HOURS PRN
Status: DISCONTINUED | OUTPATIENT
Start: 2025-07-02 | End: 2025-07-05 | Stop reason: HOSPADM

## 2025-07-02 RX ORDER — CALCIUM GLUCONATE 20 MG/ML
2000 INJECTION, SOLUTION INTRAVENOUS ONCE
Status: COMPLETED | OUTPATIENT
Start: 2025-07-02 | End: 2025-07-02

## 2025-07-02 RX ADMIN — ATORVASTATIN CALCIUM 10 MG: 10 TABLET, FILM COATED ORAL at 20:25

## 2025-07-02 RX ADMIN — TRAMADOL HYDROCHLORIDE 50 MG: 50 TABLET, COATED ORAL at 22:14

## 2025-07-02 RX ADMIN — IOPAMIDOL 75 ML: 755 INJECTION, SOLUTION INTRAVENOUS at 13:23

## 2025-07-02 RX ADMIN — ENOXAPARIN SODIUM 30 MG: 100 INJECTION SUBCUTANEOUS at 17:38

## 2025-07-02 RX ADMIN — IPRATROPIUM BROMIDE AND ALBUTEROL SULFATE 1 DOSE: .5; 3 SOLUTION RESPIRATORY (INHALATION) at 20:48

## 2025-07-02 RX ADMIN — MORPHINE SULFATE 1 MG: 2 INJECTION, SOLUTION INTRAMUSCULAR; INTRAVENOUS at 23:26

## 2025-07-02 RX ADMIN — SODIUM CHLORIDE: 0.9 INJECTION, SOLUTION INTRAVENOUS at 16:55

## 2025-07-02 RX ADMIN — ACETAMINOPHEN 500 MG: 500 TABLET ORAL at 20:45

## 2025-07-02 RX ADMIN — CALCIUM GLUCONATE 2000 MG: 20 INJECTION, SOLUTION INTRAVENOUS at 15:20

## 2025-07-02 RX ADMIN — SODIUM CHLORIDE 1000 ML: 0.9 INJECTION, SOLUTION INTRAVENOUS at 11:34

## 2025-07-02 RX ADMIN — CEFTRIAXONE SODIUM 1000 MG: 1 INJECTION, POWDER, FOR SOLUTION INTRAMUSCULAR; INTRAVENOUS at 17:02

## 2025-07-02 RX ADMIN — QUETIAPINE FUMARATE 25 MG: 25 TABLET ORAL at 20:25

## 2025-07-02 RX ADMIN — DOXYCYCLINE HYCLATE 100 MG: 100 CAPSULE ORAL at 20:25

## 2025-07-02 RX ADMIN — MAGNESIUM SULFATE HEPTAHYDRATE 2000 MG: 40 INJECTION, SOLUTION INTRAVENOUS at 13:51

## 2025-07-02 ASSESSMENT — PAIN DESCRIPTION - ORIENTATION
ORIENTATION: RIGHT;LEFT;MID
ORIENTATION: RIGHT

## 2025-07-02 ASSESSMENT — PAIN DESCRIPTION - DESCRIPTORS
DESCRIPTORS: ACHING

## 2025-07-02 ASSESSMENT — PAIN DESCRIPTION - LOCATION
LOCATION: HIP

## 2025-07-02 ASSESSMENT — PAIN SCALES - GENERAL
PAINLEVEL_OUTOF10: 4
PAINLEVEL_OUTOF10: 5
PAINLEVEL_OUTOF10: 6
PAINLEVEL_OUTOF10: 5

## 2025-07-02 ASSESSMENT — PAIN - FUNCTIONAL ASSESSMENT
PAIN_FUNCTIONAL_ASSESSMENT: PREVENTS OR INTERFERES SOME ACTIVE ACTIVITIES AND ADLS
PAIN_FUNCTIONAL_ASSESSMENT: PREVENTS OR INTERFERES SOME ACTIVE ACTIVITIES AND ADLS

## 2025-07-02 ASSESSMENT — LIFESTYLE VARIABLES
HOW MANY STANDARD DRINKS CONTAINING ALCOHOL DO YOU HAVE ON A TYPICAL DAY: PATIENT DOES NOT DRINK
HOW OFTEN DO YOU HAVE A DRINK CONTAINING ALCOHOL: NEVER

## 2025-07-02 NOTE — PROGRESS NOTES
Pharmacy Note     Enoxaparin Dose Adjustment    Chato Navas is a 89 y.o. female. Pharmacist assessment of enoxaparin dose for VTE prophylaxis.    Recent Labs     07/02/25  1100   BUN 15       Recent Labs     07/02/25  1100   CREATININE 1.0*       Estimated Creatinine Clearance: 28 mL/min (A) (based on SCr of 1 mg/dL (H)).    Height:   Ht Readings from Last 1 Encounters:   07/02/25 1.524 m (5')     Weight:  Wt Readings from Last 1 Encounters:   07/02/25 55.7 kg (122 lb 12.8 oz)       The following enoxaparin dose has been adjusted based upon renal function and/or patient weight per P&T Guidelines:             Lovenox 40mg SQ QD was changed to    Lovenox 30mg SQ QD

## 2025-07-02 NOTE — ED PROVIDER NOTES
[facility]  EMERGENCY DEPARTMENT ENCOUNTER        Pt Name: Chato Navas  MRN: [id number]  Birthdate 3/6/1932  Date of evaluation: 7/2/2025  Provider: Angelica Snow MD  PCP: Estella Hernandez MD  Note Started: 2:42 PM EDT 7/2/25    CHIEF COMPLAINT       Chief Complaint   Patient presents with    Fatigue     Patient from home. Nurse at home reported patient has been lethargic since 0930 and had difficulty finding her radial pulse. Per EMS HR 40-50's with bp 70/40. Was given 1 of atropine and 1L of fluids. Patient is only alert to self at baseline.        HISTORY OF PRESENT ILLNESS: 1 or more Elements     Chato Navas is a 89 y.o. female who presents from home.  Patient has been lethargic all morning she has noticed that her heart rate was in the 40s 50s with the blood pressure in the 70s.  She was given 1 of atropine 1 L of fluids and route to the ER with some improvement of patient's blood pressure.  Patient is baseline only alert to self she lives alone but has care around-the-clock.  Patient is a DNR CCA.  History obtained by family who states that patient has not been complaining of any symptoms recently had some constipation and was given an enema with improvement of her symptoms.  Nurse stated that the stool was brown and nonbloody.   Family states patient has had a cough but they deny any fevers or chills..  Review of system is limited due to patient's dementia  Nursing Notes were all reviewed and agreed with or any disagreements were addressed in the HPI.    ROS:   Pertinent positives and negatives are stated within HPI, all other systems reviewed and are negative.      --------------------------------------------- PAST HISTORY ---------------------------------------------  Past Medical History:  has a past medical history of Arthritis, Cancer (HCC), Cataract, Dementia (HCC), Glaucoma, Hyperlipidemia, Hypertension, Hypomagnesemia, and Stroke (HCC).    Past Surgical History:  has a

## 2025-07-02 NOTE — PROGRESS NOTES
EKG order noted, but unable to complete at this time. Patient is restless and unable to sit still. Patient is pulling at all cords and while not rest or lay back in the bed. Passed on to nursing staff for nightshift to reassess after she eats dinner to see if she is less restless.

## 2025-07-02 NOTE — PROGRESS NOTES
Writer entered room to obtain vitals and complete assessment at this time. Pt sitting in chair eating dinner at this time with several family members in the room. Pt alert to self only. Pt denies any pain at this time. Unable to obtain vitals as pt would not sit still long enough for BP to take. Writer will try again when pt is finished eating. Family unsure if anyone is staying with the pt tonight. All needs met at this time, call light within reach. Care ongoing.

## 2025-07-02 NOTE — RT PROTOCOL NOTE
RESPIRATORY ASSESSMENT PROTOCOL                                                                                              Patient Name: Chato Navas Room#: 0329/0329-01 : 3/6/1932     Admitting diagnosis: Dehydration [E86.0]  Hypomagnesemia [E83.42]  Generalized weakness [R53.1]  Community acquired pneumonia of right lower lobe of lung [J18.9]       Medical History:   Past Medical History:   Diagnosis Date    Arthritis     Cancer (HCC)     BREAST    Cataract     Dementia (HCC) 2025    Glaucoma     Hyperlipidemia     Hypertension     Hypomagnesemia 2025    Stroke (HCC)        PATIENT ASSESSMENT    LABORATORY DATA  Hematology:   Lab Results   Component Value Date/Time    WBC 4.8 2025 11:00 AM    RBC 2.89 2025 11:00 AM    HGB 9.6 2025 11:00 AM    HCT 29.6 2025 11:00 AM     2025 11:00 AM     Chemistry:  No results found for: \"PHART\", \"IUW7JZX\", \"PO2ART\", \"S4CBIKON\", \"WBA5VGV\", \"PBEA\", \"NBEA\"    VITALS  Pulse: 62   Respirations: 20  BP: (!) 118/98  SpO2: (!) 86 %    Temp: (!) 96.3 °F (35.7 °C)    SKIN COLOR  [x] Normal  [] Pale  [] Dusky  [] Cyanotic    RESPIRATORY PATTERN  [x] Normal  [] Dyspnea  [] Cheyne-Fernandes  [] Kussmaul  [] Biots    AMBULATORY  [] Yes  [] No  [] With Assistance      Patient Acuity 0 1 2 3 4 Score   Level of Consciousness (LOC) [x]  Alert & Oriented or Pt normal LOC []  Confused;follows directions []  Confused & uncooper-ative []  Obtunded []  Comatose 0   Respiratory Rate  (RR) [x]  Reg. rate & pattern. 12 - 20 bpm  []  Increased RR. Greater than 20 bpm   []  SOB w/ exertion or RR greater than 24 bpm []  Access- ory muscle use at rest. Abn.  resp. []  SOB at rest.   0   Bilateral Breath Sounds (BBS) [x]  Clear []  Diminish-ed bases  []  Diminish-ed t/o, or rales   []  Sporadic, scattered wheezes or rhonchi []  Persistentwheezes and, or absent BBS 0   Cough [x]  Strong, effective, & non-prod. []  Effective & prod. Less than 25 ml (2 TBSP)

## 2025-07-02 NOTE — PLAN OF CARE
Problem: Chronic Conditions and Co-morbidities  Goal: Patient's chronic conditions and co-morbidity symptoms are monitored and maintained or improved  Outcome: Progressing     Problem: Discharge Planning  Goal: Discharge to home or other facility with appropriate resources  Outcome: Progressing     Problem: ABCDS Injury Assessment  Goal: Absence of physical injury  Outcome: Progressing     Problem: Confusion  Goal: Confusion, delirium, dementia, or psychosis is improved or at baseline  Description: INTERVENTIONS:  1. Assess for possible contributors to thought disturbance, including medications, impaired vision or hearing, underlying metabolic abnormalities, dehydration, psychiatric diagnoses, and notify attending LIP  2. [place] high risk fall precautions, as indicated  3. Provide frequent short contacts to provide reality reorientation, refocusing and direction  4. Decrease environmental stimuli, including noise as appropriate  5. Monitor and intervene to maintain adequate nutrition, hydration, elimination, sleep and activity  6. If unable to ensure safety without constant attention obtain sitter and review sitter guidelines with assigned personnel  7. Initiate Psychosocial CNS and Spiritual Care consult, as indicated  Outcome: Progressing     Problem: Risk for Elopement  Goal: Patient will not exit the unit/facility without proper excort  Outcome: Progressing     Problem: Skin/Tissue Integrity  Goal: Skin integrity remains intact  Description: 1.  Monitor for areas of redness and/or skin breakdown  2.  Assess vascular access sites hourly  3.  Every 4-6 hours minimum:  Change oxygen saturation probe site  4.  Every 4-6 hours:  If on nasal continuous positive airway pressure, respiratory therapy assess nares and determine need for appliance change or resting period  Outcome: Progressing     Problem: Safety - Adult  Goal: Free from fall injury  Outcome: Progressing     Problem: Chronic Conditions and

## 2025-07-02 NOTE — H&P
History and Physical    Patient:  Chato Navas  MRN: [id number]    Chief Complaint:  fatigue    History Obtained From:  family member - daughters, electronic medical record    PCP: Estella Hernandez MD    History of Present Illness:   The patient is a 89 y.o. female who presented to the ER from home via EMS with daughters with complaints of fatigue.  They reported lethargy.  They reported her HR was in the 40s-50s with blood pressure in the 70s.  She was given 1 dose of Atropine by EMS and 1L of IVF while in-route to the ER.  Upon arrival patient had improvement in VS.  At baseline the family reports that she is oriented to self and lives alone however they have hired caregivers and one of her children stay each night with her.  She is a DNRCC-Arrest.  Family reported that she had been constipated and was given an enema that produced BM.  They reported brown stool with no melena or hematochezia.  Family reported non-productive cough. No fever/chills reported.  Upon arrival BP was improved up to 116 systolically and responded well to IVF.  No fever was seen however hypothermia with temperature of 95.8 degrees Fahrenheit. She remained bradycardic in the 50s.  During her workup CMP her Creatinine was 1.1 with a BUN of 15.  Her Magnesium was 0.8 and given 2 gm.  Calcium was 7.9 and I ordered 2 gm of Calcium Gluconate.  Lactic Acid was 0.9.  Troponin was 23 and 11.  No Leukocytosis was seen.  Hgb was 9.6 with no labs for comparison.  She is pale.  Patient is pleasant but [place].  Nurses report that she is asking for \"the Lord to take me\".      Past Medical History:        Diagnosis Date    Arthritis     Cancer (HCC)     BREAST    Cataract     Dementia (HCC) 07/02/2025    Glaucoma     Hyperlipidemia     Hypertension     Hypomagnesemia 07/02/2025    Stroke (HCC)        Past Surgical History:        Procedure Laterality Date    BREAST LUMPECTOMY Right     CATARACT REMOVAL Bilateral     SHOULDER SURGERY      LEFT SHOULDER REPLACED  prn  Medication Monitoring / High Risk Medications: none      Hypomagnesemia / Bradycardia    Condition is unchanged  Treatment plan:   Telemetry  Trend Magnesium  Code Status :  DNRCC-Arrest  Imaging: no further imaging studies ordered today  Medications:   Magnesium Replacement  Calcium Replacment    Dementia    Condition is a chronic stable condition  Treatment plan:   Fall risk  Up with assistance  SS-DC planning  Imaging: no further imaging studies ordered today  Medications:   Continue Seroquel    Nutrition status:   at risk for malnutrition  Dietician consult initiated    Hospital Prophylaxis:   DVT: Lovenox   Stress Ulcer: PPI     MDM Data:   Test interpretation:  My independent EKG interpretation: sinus bradycardia, RBBB  My independent X-ray interpretation:  reviewed  Management and/or test interpretation discussed with ER MD at time of admission  Consults and Nursing notes were personally reviewed, all current labs and imaging were personally reviewed, tests ordered: CBC, BMP, and history obtained by independent historian       Disposition:  Shared decision making: All test results, treatment options and disposition options were discussed with the patient and family members today  Social determinants of health that may impact management: Dementia but Family cares for her and hired caregivers, Dementia  Code status: DNR-CCA   Disposition: Discharge plan is pending        Critical Care Time:  Total critical care time caring for this patient with life threatening, unstable organ failure, including direct patient contact, management of life support systems, review of data including imaging and labs, discussions with other team members and physicians at least 0 minutes so far today, excluding separately billable procedures.      MIPS Medication Reconciliation documentation:    [x] I have utilized all available immediate resources to obtain, update, or review the patient's current medications (including all

## 2025-07-03 ENCOUNTER — APPOINTMENT (OUTPATIENT)
Dept: GENERAL RADIOLOGY | Age: 89
End: 2025-07-03
Payer: MEDICARE

## 2025-07-03 LAB
ANION GAP SERPL CALCULATED.3IONS-SCNC: 11 MMOL/L (ref 9–16)
BASOPHILS # BLD: 0.08 K/UL (ref 0–0.2)
BASOPHILS NFR BLD: 1 % (ref 0–2)
BUN SERPL-MCNC: 14 MG/DL (ref 8–23)
BUN/CREAT SERPL: 16 (ref 9–20)
CALCIUM SERPL-MCNC: 9.1 MG/DL (ref 8.6–10.4)
CHLORIDE SERPL-SCNC: 107 MMOL/L (ref 98–107)
CO2 SERPL-SCNC: 24 MMOL/L (ref 20–31)
CREAT SERPL-MCNC: 0.9 MG/DL (ref 0.5–0.9)
CRP SERPL HS-MCNC: 4.2 MG/L (ref 0–5)
EOSINOPHIL # BLD: 0 K/UL (ref 0–0.44)
EOSINOPHILS RELATIVE PERCENT: 0 % (ref 1–4)
ERYTHROCYTE [DISTWIDTH] IN BLOOD BY AUTOMATED COUNT: 13.2 % (ref 11.8–14.4)
GFR, ESTIMATED: 60 ML/MIN/1.73M2
GLUCOSE SERPL-MCNC: 80 MG/DL (ref 74–99)
HCT VFR BLD AUTO: 25.8 % (ref 36.3–47.1)
HCT VFR BLD AUTO: 37.5 % (ref 36.3–47.1)
HGB BLD-MCNC: 12.1 G/DL (ref 11.9–15.1)
HGB BLD-MCNC: 8.5 G/DL (ref 11.9–15.1)
IMM GRANULOCYTES # BLD AUTO: 0 K/UL (ref 0–0.3)
IMM GRANULOCYTES NFR BLD: 0 %
LYMPHOCYTES NFR BLD: 1.26 K/UL (ref 1.1–3.7)
LYMPHOCYTES RELATIVE PERCENT: 16 % (ref 24–43)
MCH RBC QN AUTO: 33 PG (ref 25.2–33.5)
MCHC RBC AUTO-ENTMCNC: 32.3 G/DL (ref 28.4–34.8)
MCV RBC AUTO: 102.2 FL (ref 82.6–102.9)
MONOCYTES NFR BLD: 0.79 K/UL (ref 0.1–1.2)
MONOCYTES NFR BLD: 10 % (ref 3–12)
MORPHOLOGY: NORMAL
NEUTROPHILS NFR BLD: 73 % (ref 36–65)
NEUTS SEG NFR BLD: 5.77 K/UL (ref 1.5–8.1)
NRBC BLD-RTO: 0 PER 100 WBC
PLATELET # BLD AUTO: 250 K/UL (ref 138–453)
PMV BLD AUTO: 11 FL (ref 8.1–13.5)
POTASSIUM SERPL-SCNC: 4 MMOL/L (ref 3.7–5.3)
PROCALCITONIN SERPL-MCNC: 0.05 NG/ML (ref 0–0.09)
RBC # BLD AUTO: 3.67 M/UL (ref 3.95–5.11)
SODIUM SERPL-SCNC: 142 MMOL/L (ref 136–145)
WBC OTHER # BLD: 7.9 K/UL (ref 3.5–11.3)

## 2025-07-03 PROCEDURE — 94150 VITAL CAPACITY TEST: CPT

## 2025-07-03 PROCEDURE — 85014 HEMATOCRIT: CPT

## 2025-07-03 PROCEDURE — 80048 BASIC METABOLIC PNL TOTAL CA: CPT

## 2025-07-03 PROCEDURE — 86140 C-REACTIVE PROTEIN: CPT

## 2025-07-03 PROCEDURE — 97116 GAIT TRAINING THERAPY: CPT

## 2025-07-03 PROCEDURE — 85025 COMPLETE CBC W/AUTO DIFF WBC: CPT

## 2025-07-03 PROCEDURE — 73502 X-RAY EXAM HIP UNI 2-3 VIEWS: CPT

## 2025-07-03 PROCEDURE — 85018 HEMOGLOBIN: CPT

## 2025-07-03 PROCEDURE — 97166 OT EVAL MOD COMPLEX 45 MIN: CPT

## 2025-07-03 PROCEDURE — 94669 MECHANICAL CHEST WALL OSCILL: CPT

## 2025-07-03 PROCEDURE — 1200000000 HC SEMI PRIVATE

## 2025-07-03 PROCEDURE — 94640 AIRWAY INHALATION TREATMENT: CPT

## 2025-07-03 PROCEDURE — 6370000000 HC RX 637 (ALT 250 FOR IP): Performed by: INTERNAL MEDICINE

## 2025-07-03 PROCEDURE — 6360000002 HC RX W HCPCS: Performed by: NURSE PRACTITIONER

## 2025-07-03 PROCEDURE — 6370000000 HC RX 637 (ALT 250 FOR IP): Performed by: NURSE PRACTITIONER

## 2025-07-03 PROCEDURE — 2500000003 HC RX 250 WO HCPCS: Performed by: NURSE PRACTITIONER

## 2025-07-03 PROCEDURE — 94761 N-INVAS EAR/PLS OXIMETRY MLT: CPT

## 2025-07-03 PROCEDURE — 2580000003 HC RX 258: Performed by: NURSE PRACTITIONER

## 2025-07-03 PROCEDURE — 97162 PT EVAL MOD COMPLEX 30 MIN: CPT

## 2025-07-03 PROCEDURE — 36415 COLL VENOUS BLD VENIPUNCTURE: CPT

## 2025-07-03 PROCEDURE — 94664 DEMO&/EVAL PT USE INHALER: CPT

## 2025-07-03 RX ORDER — AMLODIPINE BESYLATE 5 MG/1
5 TABLET ORAL DAILY
Status: DISCONTINUED | OUTPATIENT
Start: 2025-07-04 | End: 2025-07-05 | Stop reason: HOSPADM

## 2025-07-03 RX ADMIN — IPRATROPIUM BROMIDE AND ALBUTEROL SULFATE 1 DOSE: .5; 3 SOLUTION RESPIRATORY (INHALATION) at 15:59

## 2025-07-03 RX ADMIN — DOXYCYCLINE HYCLATE 100 MG: 100 CAPSULE ORAL at 20:44

## 2025-07-03 RX ADMIN — ENOXAPARIN SODIUM 30 MG: 100 INJECTION SUBCUTANEOUS at 08:17

## 2025-07-03 RX ADMIN — PANTOPRAZOLE SODIUM 40 MG: 40 TABLET, DELAYED RELEASE ORAL at 08:18

## 2025-07-03 RX ADMIN — TIMOLOL MALEATE 1 DROP: 2.5 SOLUTION OPHTHALMIC at 08:18

## 2025-07-03 RX ADMIN — LATANOPROST 1 DROP: 50 SOLUTION/ DROPS OPHTHALMIC at 08:17

## 2025-07-03 RX ADMIN — SODIUM CHLORIDE: 0.9 INJECTION, SOLUTION INTRAVENOUS at 05:11

## 2025-07-03 RX ADMIN — LEVOTHYROXINE SODIUM 75 MCG: 75 TABLET ORAL at 08:18

## 2025-07-03 RX ADMIN — QUETIAPINE FUMARATE 25 MG: 25 TABLET ORAL at 20:44

## 2025-07-03 RX ADMIN — IPRATROPIUM BROMIDE AND ALBUTEROL SULFATE 1 DOSE: .5; 3 SOLUTION RESPIRATORY (INHALATION) at 08:55

## 2025-07-03 RX ADMIN — ATORVASTATIN CALCIUM 10 MG: 10 TABLET, FILM COATED ORAL at 20:44

## 2025-07-03 RX ADMIN — AMLODIPINE BESYLATE 2.5 MG: 2.5 TABLET ORAL at 08:18

## 2025-07-03 RX ADMIN — DOXYCYCLINE HYCLATE 100 MG: 100 CAPSULE ORAL at 08:18

## 2025-07-03 RX ADMIN — SODIUM CHLORIDE, PRESERVATIVE FREE 10 ML: 5 INJECTION INTRAVENOUS at 20:44

## 2025-07-03 RX ADMIN — CEFTRIAXONE SODIUM 1000 MG: 1 INJECTION, POWDER, FOR SOLUTION INTRAMUSCULAR; INTRAVENOUS at 16:20

## 2025-07-03 RX ADMIN — ACETAMINOPHEN 500 MG: 500 TABLET ORAL at 11:57

## 2025-07-03 RX ADMIN — HYDROXYCHLOROQUINE SULFATE 200 MG: 200 TABLET ORAL at 08:18

## 2025-07-03 RX ADMIN — ASPIRIN 325 MG: 325 TABLET ORAL at 08:18

## 2025-07-03 NOTE — PROGRESS NOTES
ARIK met with pt caregiver this afternoon and she reports that dtr was considering [facility] for services. ARIK ent referral through [facility] for [facility] and await response if they can accept. BOO Ojeda, LSW 7/3/2025     [facility] has accepted pt for start of care early next week. ARIK notified Rebecca of time frame for SOC and she is in agreement with this. ARIK notified [facility] that SOC is fine and to call Rebecca to schedule appointments. Note left for  for orders. BOO Ojeda, LSW 7/3/2025

## 2025-07-03 NOTE — PROGRESS NOTES
MEÑO, supervisor RN talked with family about moving pt room closer to the nurses station at this time. Family agreeable. Pt will be moved to room 316 for safety reasons. Care ongoing.

## 2025-07-03 NOTE — PROGRESS NOTES
Admitted to room 329 from ER for treatment of pneumonia. Family at bedside but does not plan to stay with the patient tonight. Khanh PADRON nursing supervisor updated on pt continued attempts to get up unassisted and will likely need to have someone sit with her tonight for safety.

## 2025-07-03 NOTE — PROGRESS NOTES
Occupational Therapy  Facility/Department: [facility] MED SURG  Occupational Therapy Initial Assessment    Name: Chato Navas  : 3/6/1932  MRN: [id number]  Date of Service: 7/3/2025    Discharge Recommendations:  Continue to assess pending progress, Subacute/Skilled Nursing Facility  OT Equipment Recommendations  Other: Family is currently providing 24 hour care but daughter is concerned they are not able to provide sufficient care with recent decline in status. Recommend subacute / skilled nursing at this time.       Patient Diagnosis(es): The primary encounter diagnosis was Generalized weakness. Diagnoses of Hypomagnesemia and Dehydration were also pertinent to this visit.  Past Medical History:  has a past medical history of Arthritis, Cancer (HCC), Cataract, Dementia (HCC), Glaucoma, Hyperlipidemia, Hypertension, Hypomagnesemia, and Stroke (HCC).  Past Surgical History:  has a past surgical history that includes shoulder surgery; Breast lumpectomy (Right); and Cataract removal (Bilateral).    Treatment Diagnosis: Weakness      Assessment  Performance deficits / Impairments: Decreased functional mobility ;Decreased safe awareness;Decreased balance;Decreased coordination;Decreased ADL status;Decreased posture;Decreased ROM;Decreased endurance;Decreased high-level IADLs;Decreased strength  Assessment: 89 year old female admitted to [facility] due to pneumonia of right lower lobe. Patient presents with general weakness requiring supervision and increased time for ADLs. Patient would benefit from OT to address deficits listed above to ensure safe return to independent home living.  Treatment Diagnosis: Weakness  Prognosis: Fair  Decision Making: Medium Complexity  REQUIRES OT FOLLOW-UP: Yes     Plan  Occupational Therapy Plan  Times Per Day: Once a day  Days Per Week: 7 Days  Current Treatment Recommendations: Strengthening, ROM, Balance training, Functional mobility training, Endurance training, Safety education &

## 2025-07-03 NOTE — PROGRESS NOTES
Patient awake in bed, with family at bedside. Patient alert to self, but disoriented to location, situation,and time. Assessment and vitals completed, see flowsheets. Diminished lung sounds noted, with +1 bilateral lower extremity edema. Dr. Galeano in to update the family about the plan. Morning medications given, see MAR. Patient and family have no further needs at this time. Call light within reach. Care ongoing.

## 2025-07-03 NOTE — PROGRESS NOTES
training, Gait training, Endurance training, Pain management, Home exercise program, Safety education & training, Patient/Caregiver education & training, Therapeutic activities, Positioning  Safety Devices  Type of Devices: All fall risk precautions in place  Restraints  Restraints Initially in Place: No    Restrictions  Restrictions/Precautions  Restrictions/Precautions: Fall Risk, General Precautions  Activity Level: Up Ad Dary  Required Braces or Orthoses?: No     Subjective  General  Chart Reviewed: Yes  Patient assessed for rehabilitation services?: Yes  Family/Caregiver Present: Yes  Referring Practitioner: Nai Boston APRN - CNP  Referral Date : 07/02/25  Diagnosis: Dehydration, E86.0  Follows Commands: Within Functional Limits  Subjective  Subjective: Patient reports 3/10 R hip pain upon PT arrival.         Social/Functional History  Social/Functional History  Lives With: Home care staff, Family  Type of Home: House  Home Layout: One level  Home Access: Ramped entrance  Bathroom Shower/Tub: Walk-in shower, Shower chair with back  Bathroom Toilet: Standard  Bathroom Equipment: Grab bars around toilet, Grab bars in shower, 3-in-1 Commode  Home Equipment: Walker - Standard, Wheelchair - Manual, Cane, Rollator  Has the patient had two or more falls in the past year or any fall with injury in the past year?: Yes  Receives Help From: Family, Friend(s)  Prior Level of Assist for ADLs: Needs assistance  Toileting: Needs assistance  Prior Level of Assist for Homemaking: Needs assistance  Homemaking Responsibilities: No  Prior Level of Assist for Ambulation: Needs assistance  Prior Level of Assist for Transfers: Needs assistance  Active : No  Additional Comments: The patient's family reports she was able to ambulate with a FWW, but required assistance.  She requires help with ADLs.  Vision/Hearing  Vision  Vision: Impaired  Vision Exceptions: Wears glasses at all times  Hearing  Hearing: Exceptions  34.07  Mobility Inpatient CMS 0-100% Score: 71.66  Mobility Inpatient without Stair CMS G-Code Modifier : CL    Goals  Short Term Goals  Time Frame for Short Term Goals: 10 days  Short Term Goal 1: Patient will be able to ambulate 10' with FWW, min A.  Short Term Goal 2: Patient will perform bed mobility tasks and transfers with Min A.  Short Term Goal 3: Patient will tolerate 20-30 minutes of therex/act to improve endurance for ADLs.  Patient Goals   Patient Goals : No stated goals       Education  Patient Education  Education Given To: Patient  Education Provided: Role of Therapy;Plan of Care  Education Method: Verbal  Barriers to Learning: Cognition  Education Outcome: Verbalized understanding      Therapy Time   Individual Concurrent Group Co-treatment   Time In 0900         Time Out 0915         Minutes 15         Timed Code Treatment Minutes: 15 Minutes       Erwin Gresham PT , DPT, OCS, Cert. DN

## 2025-07-03 NOTE — PROGRESS NOTES
Vitals and assessment were completed at this time. Writer walked patient and her son through the medications that would be administered tonight and encouraged patient to ask questions about the medications and therapies. Patient remains oriented only to self but is agreeable to take her meds. Son denies questions.  Patient has to be redirected from trying to remove her IV. Patient does not follow directions well but remains pleasantly confused.  Son is sitting in the room with patient but will be leaving shortly. He is aware she will have 1:1 staff tonight.  Call light and bedside table remain within reach, bed alarm set. Patient denies needs at this time. Care ongoing.

## 2025-07-03 NOTE — PLAN OF CARE
Problem: Chronic Conditions and Co-morbidities  Goal: Patient's chronic conditions and co-morbidity symptoms are monitored and maintained or improved  7/3/2025 0753 by Ella Davies RN  Outcome: Progressing     Problem: Discharge Planning  Goal: Discharge to home or other facility with appropriate resources  7/3/2025 0753 by Ella Davies RN  Outcome: Progressing     Problem: ABCDS Injury Assessment  Goal: Absence of physical injury  7/3/2025 0753 by Ella Davies RN  Outcome: Progressing     Problem: Confusion  Goal: Confusion, delirium, dementia, or psychosis is improved or at baseline  Description: INTERVENTIONS:  1. Assess for possible contributors to thought disturbance, including medications, impaired vision or hearing, underlying metabolic abnormalities, dehydration, psychiatric diagnoses, and notify attending LIP  2. [place] high risk fall precautions, as indicated  3. Provide frequent short contacts to provide reality reorientation, refocusing and direction  4. Decrease environmental stimuli, including noise as appropriate  5. Monitor and intervene to maintain adequate nutrition, hydration, elimination, sleep and activity  6. If unable to ensure safety without constant attention obtain sitter and review sitter guidelines with assigned personnel  7. Initiate Psychosocial CNS and Spiritual Care consult, as indicated  7/3/2025 0753 by Ella Davies RN  Outcome: Progressing     Problem: Risk for Elopement  Goal: Patient will not exit the unit/facility without proper excort  7/3/2025 0753 by Ella Davies RN  Outcome: Progressing     Problem: Skin/Tissue Integrity  Goal: Skin integrity remains intact  Description: 1.  Monitor for areas of redness and/or skin breakdown  2.  Assess vascular access sites hourly  3.  Every 4-6 hours minimum:  Change oxygen saturation probe site  4.  Every 4-6 hours:  If on nasal continuous positive airway pressure, respiratory therapy assess nares and

## 2025-07-03 NOTE — PROGRESS NOTES
Pt continues to try and climb out of bed, pull at IV and states she is getting up out of bed. Writer tried to explain this is not safe as she complains of hip pain and just had pain medication. Patient does not listen and continues to try and get up. Care ongoing.

## 2025-07-03 NOTE — PROGRESS NOTES
Pt up with 2 assist and rolling walker. Ambulated pt around bed and assisted to bed. Pt requires extensive support to ambulate via verbal cues an holding walker down. Pt will attempt to  walker or push it away without stepping into it. Pt needs near constant reminders to  feet and to take a forward step. Non-family caregiver present at the time of transfer to bed and witnessed ambulation. Pt did not appear to be in pain and did not verbally complain of pain when ambulating as she was before her dose of Tylenol. Purewick removed and brief changed.

## 2025-07-03 NOTE — PROGRESS NOTES
Pt has been moving and getting out of bed nonstop since admission. Asking to get up to the bathroom near constantly. Unable to focus on a single task. Needs near constant redirection. Pulling at IV lines and telemetry wires, throwing legs over side of bed. Family remains at bedside. Pt encouraged to finish dinner but keeps getting out of bed.

## 2025-07-03 NOTE — PROGRESS NOTES
Comprehensive Nutrition Assessment    Type and Reason for Visit:  Initial    Nutrition Recommendations/Plan:   Encourage oral intakes.      Malnutrition Assessment:  Malnutrition Status:  At risk for malnutrition (07/03/25 0878)    Context:  Acute Illness     Findings of the 6 clinical characteristics of malnutrition:  Energy Intake:  No decrease in energy intake  Weight Loss:  No weight loss     Body Fat Loss:  Mild body fat loss Fat Overlying Ribs   Muscle Mass Loss:  No muscle mass loss    Fluid Accumulation:  Mild Extremities   Strength:  Not Performed    Nutrition Assessment:    No nutrition diagnosis at this time. Advanced aged with fair PO c/w her age and weight for intakes. Good reported appetite and intakes pta per family with weight stability currently. Low Mg++ and Ca++ both corrected without clear etiology.  Family denies nutrition questions or concerns at this time and likely to be available when meal orders are taken.    Nutrition Related Findings:    active b/s. +1 generalized edema. +2 BLE edema. Wound Type: None       Current Nutrition Intake & Therapies:    Average Meal Intake: 51-75%  Average Supplements Intake: None Ordered  ADULT DIET; Regular    Anthropometric Measures:  Height: 152.4 cm (5')  Ideal Body Weight (IBW): 100 lbs (45 kg)    Admission Body Weight: 55.7 kg (122 lb 12.8 oz)  Current Body Weight: 56 kg (123 lb 6.4 oz), 123.4 % IBW. Weight Source: Bed scale  Current BMI (kg/m2): 24.1  Usual Body Weight: 51.7 kg (114 lb) (2021)     % Weight Change (Calculated): 8.2  Weight Adjustment For: No Adjustment                 BMI Categories: Normal Weight (BMI 18.5-24.9)    Estimated Daily Nutrient Needs:  Energy Requirements Based On: Kcal/kg  Weight Used for Energy Requirements: Current  Energy (kcal/day): 9348-5292 (25-30)  Weight Used for Protein Requirements: Current  Protein (g/day): 67-78 (1.2-1.4)  Method Used for Fluid Requirements: 1 ml/kcal  Fluid (ml/day): 1600    Nutrition

## 2025-07-03 NOTE — PROGRESS NOTES
Sitter-daughter at bedside. Report to Antonia, daughter voices confidence and understanding in sitting with patient.

## 2025-07-03 NOTE — PROGRESS NOTES
Patients blood pressures elevated above baseline. Manual blood pressure reading 182/90. Nai MENDEZ notified of blood pressures.

## 2025-07-03 NOTE — RT PROTOCOL NOTE
RESPIRATORY ASSESSMENT PROTOCOL                                                                                              Patient Name: Chato Navas Room#: 0316/0316-01 : 3/6/1932     Admitting diagnosis: Dehydration [E86.0]  Hypomagnesemia [E83.42]  Generalized weakness [R53.1]  Community acquired pneumonia of right lower lobe of lung [J18.9]       Medical History:   Past Medical History:   Diagnosis Date    Arthritis     Cancer (HCC)     BREAST    Cataract     Dementia (HCC) 2025    Glaucoma     Hyperlipidemia     Hypertension     Hypomagnesemia 2025    Stroke (HCC)        PATIENT ASSESSMENT    LABORATORY DATA  Hematology:   Lab Results   Component Value Date/Time    WBC 7.9 2025 06:05 AM    RBC 3.67 2025 06:05 AM    HGB 8.5 2025 08:19 AM    HCT 25.8 2025 08:19 AM     2025 06:05 AM     Chemistry:  No results found for: \"PHART\", \"MTA3RIG\", \"PO2ART\", \"B8WIVGLX\", \"EHZ6QVV\", \"PBEA\", \"NBEA\"    VITALS  Pulse: 69   Respirations: 18  BP: (!) 146/76  SpO2: 95 % O2 Device: None (Room air)  Temp: 97.3 °F (36.3 °C)    SKIN COLOR  [x] Normal  [] Pale  [] Dusky  [] Cyanotic    RESPIRATORY PATTERN  [x] Normal  [] Dyspnea  [] Cheyne-Fernandes  [] Kussmaul  [] Biots    AMBULATORY  [] Yes  [] No  [x] With Assistance        Patient Acuity 0 1 2 3 4 Score   Level of Consciousness (LOC) [x]  Alert & Oriented or Pt normal LOC []  Confused;follows directions []  Confused & uncooper-ative []  Obtunded []  Comatose 0   Respiratory Rate  (RR) [x]  Reg. rate & pattern. 12 - 20 bpm  []  Increased RR. Greater than 20 bpm   []  SOB w/ exertion or RR greater than 24 bpm []  Access- ory muscle use at rest. Abn.  resp. []  SOB at rest.   0   Bilateral Breath Sounds (BBS) [x]  Clear []  Diminish-ed bases  []  Diminish-ed t/o, or rales   []  Sporadic, scattered wheezes or rhonchi []  Persistentwheezes and, or absent BBS 0   Cough [x]  Strong, effective, & non-prod. []  Effective & prod. Less  hrs.   If unable to utilize MDI: HHN [physician-ordered bronchodilator(s)] TID and Albuterol PRN q4 hrs.   Notify physician if condition deteriorates. MDI THERAPY with  [physician-ordered bronchodilator(s)] via spacer TID PRN.   If unable to utilize MDI: HHN [physician-ordered bronchodilator(s)] TID PRN.   Notify physician if condition deteriorates.         If Acuity Level is 2, 3, or 4 in any of the following:    [] COUGH     [] SURGICAL HISTORY (SURG HX)  [x] CHEST XRAY (CXR)    Goal: Improvement in sputum mobilization in patients with ineffective airway clearance. Reverse atelectasis.    [x] Bronchopulmonary Hygiene Protocol      Total Acuity:   14-28  []  Secondary Assessment in 24 hrs Total Acuity:  9-13  []  Secondary Assessment in 24 hrs Total Acuity:  4-8  [x]  Secondary Assessment in 24 hrs Total Acuity:  0-3  []  Secondary Assessment in 48 hrs   METANEB QID with [physician-ordered bronchodilator(s)] if CXR Acuity = 4; otherwise:  PD&P, Oscillatory Therapy, or Vest QID & PRN AND PEP QID & PRN  NT Sxn PRN for ineffective cough  METANEB QID with [physician-ordered bronchodilator(s)] if CXR Acuity = 4; otherwise:  PD&P, Oscillatory Therapy or Vest QID & PRN AND PEP QID & PRN  NT Sxn PRN for ineffective cough  PD&P, Oscillatory Therapy, or Vest TID & PRN AND PEP TID & PRN   Instruct patient to self-perform IS q1hr WA       If Acuity Level is 2 or above in the following:    [] PULMONARY HISTORY (PULM HX)    Goal: Assist patient in quitting smoking to slow or stop the progression of lung disease.    [] Smoking Cessation Protocol    SMOKING CESSATION EDUCATION provided according to policy RT_201: (sin with an X)  ____Yes    ____ No     ____ NA    Smoking Cessation Booklet given:  ____Yes  ____No ____Patient Refused

## 2025-07-03 NOTE — PLAN OF CARE
Problem: Chronic Conditions and Co-morbidities  Goal: Patient's chronic conditions and co-morbidity symptoms are monitored and maintained or improved  7/2/2025 2006 by Odalis Dow RN  Outcome: Progressing  Flowsheets (Taken 7/2/2025 1901)  Care Plan - Patient's Chronic Conditions and Co-Morbidity Symptoms are Monitored and Maintained or Improved: Monitor and assess patient's chronic conditions and comorbid symptoms for stability, deterioration, or improvement  7/2/2025 1719 by Becca Costa RN  Outcome: Progressing     Problem: Discharge Planning  Goal: Discharge to home or other facility with appropriate resources  7/2/2025 2006 by Odalis Dow, RN  Outcome: Progressing  Flowsheets (Taken 7/2/2025 1901)  Discharge to home or other facility with appropriate resources: Identify barriers to discharge with patient and caregiver  7/2/2025 1719 by Becca Costa RN  Outcome: Progressing     Problem: ABCDS Injury Assessment  Goal: Absence of physical injury  7/2/2025 2006 by Odalis Dow RN  Outcome: Progressing  7/2/2025 1719 by Becca Costa RN  Outcome: Progressing     Problem: Confusion  Goal: Confusion, delirium, dementia, or psychosis is improved or at baseline  Description: INTERVENTIONS:  1. Assess for possible contributors to thought disturbance, including medications, impaired vision or hearing, underlying metabolic abnormalities, dehydration, psychiatric diagnoses, and notify attending LIP  2. [place] high risk fall precautions, as indicated  3. Provide frequent short contacts to provide reality reorientation, refocusing and direction  4. Decrease environmental stimuli, including noise as appropriate  5. Monitor and intervene to maintain adequate nutrition, hydration, elimination, sleep and activity  6. If unable to ensure safety without constant attention obtain sitter and review sitter guidelines with assigned personnel  7. Initiate Psychosocial CNS and Spiritual Care

## 2025-07-03 NOTE — PROGRESS NOTES
Progress Note    SUBJECTIVE:    Patient seen for f/u of Community acquired pneumonia of right lower lobe of lung.  She resting in bed no distress with family at side. Pleasant and answers questions.  Was complaining of right hip pain in the groin but ambulates and moves leg easily     ROS:   Constitutional: negative  for fevers, and negative for chills.  Respiratory: negative for shortness of breath, negative for cough, and negative for wheezing  Cardiovascular: negative for chest pain, and negative for palpitations  Gastrointestinal: negative for abdominal pain, negative for nausea,negative for vomiting, negative for diarrhea, and negative for constipation     All other systems were reviewed with the patient and are negative unless otherwise stated in HPI      OBJECTIVE:      Vitals:   Vitals:    07/02/25 2326   BP:    Pulse:    Resp: 17   Temp:    SpO2:      Weight - Scale: 56 kg (123 lb 6.4 oz)   Height: 152.4 cm (5')     Weight  Wt Readings from Last 3 Encounters:   07/03/25 56 kg (123 lb 6.4 oz)   08/16/21 51.7 kg (114 lb)   02/17/21 52.1 kg (114 lb 12.8 oz)     Body mass index is 24.1 kg/m².    24HR INTAKE/OUTPUT:      Intake/Output Summary (Last 24 hours) at 7/3/2025 0754  Last data filed at 7/3/2025 0529  Gross per 24 hour   Intake 2080.17 ml   Output 1900 ml   Net 180.17 ml     -----------------------------------------------------------------  Exam:    GEN:    Awake, alert and oriented to person only.   EYES:  EOMI, pupils equal   NECK: Supple. No lymphadenopathy.  No carotid bruit  CVS:    regular rate and rhythm, no audible murmur  PULM:  CTA, no wheezes, rales or rhonchi, no acute respiratory distress  ABD:    Bowels sounds normal.  Abdomen is soft.  No distention.  no tenderness to palpation.   EXT:   trace edema RLE (baseline) .  No calf tenderness.   NEURO: Moves all extremities.  Motor and sensory are grossly intact  SKIN:  No rashes.  No skin lesions.

## 2025-07-03 NOTE — PROGRESS NOTES
Physical Therapy  Facility/Department: [facility] MED SURG  Daily Treatment Note  NAME: Chato Navas  : 3/6/1932  MRN: [id number]    Date of Service: 7/3/2025    Discharge Recommendations:  Continue to assess pending progress, Subacute/Skilled Nursing Facility, Long Term Care with PT, Home with Home health PT     Patient Diagnosis(es): The primary encounter diagnosis was Generalized weakness. Diagnoses of Hypomagnesemia and Dehydration were also pertinent to this visit.    Assessment  Assessment: Transfers: Mind/Mod A x2. Bed mobility: Mod A. Gait with WW, Mod A x2 for safety with verbal, manual and visual cues. for proper gait pattern and safety with use of WW. Slow cadnee with decreased step clearance anshuffled gait. Pt.was able to complete 20ftx1 with increased time to complete.  Activity Tolerance: Patient tolerated treatment well;Treatment limited secondary to decreased cognition  Equipment Needed: No    Plan  Physical Therapy Plan  General Plan: 2 times a day 7 days a week  Current Treatment Recommendations: Strengthening;ROM;Balance training;Functional mobility training;Transfer training;ADL/Self-care training;IADL training;Manual;Neuromuscular re-education;Stair training;Gait training;Endurance training;Pain management;Home exercise program;Safety education & training;Patient/Caregiver education & training;Therapeutic activities;Positioning    Restrictions  Restrictions/Precautions  Restrictions/Precautions: Fall Risk, General Precautions  Activity Level: Up Ad Dary  Required Braces or Orthoses?: No     Subjective   Subjective  Subjective: Pt. in chair upon arrival with family present.  Pain: does not report    Objective  Bed Mobility Training  Bed Mobility Training: Yes  Overall Level of Assistance: Minimal assistance;Partial/Moderate assistance  Interventions: Verbal cues  Rolling: Minimal assistance  Sit to Supine: Partial/Moderate assistance  Scooting: Partial/Moderate assistance  Transfer

## 2025-07-03 NOTE — PROGRESS NOTES
Pt c/o bilateral hip pain on and off, see MAR. Patients daughter also very concerned that patient will become constipated again since she was constipated yesterday for a few hours.' Writer stated that she would talk to the np about a stool softener. Care ongoing.

## 2025-07-03 NOTE — CARE COORDINATION
Case Management Assessment  Initial Evaluation    Date/Time of Evaluation: 7/3/2025 10:52 AM  Assessment Completed by: BOO Ojeda, AURELIAW    If patient is discharged prior to next notation, then this note serves as note for discharge by case management.    Patient Name: Chato Navas                   YOB: 1932  Diagnosis: Dehydration [E86.0]  Hypomagnesemia [E83.42]  Generalized weakness [R53.1]  Community acquired pneumonia of right lower lobe of lung [J18.9]                   Date / Time: 7/2/2025 10:41 AM    Patient Admission Status: Inpatient   Readmission Risk (Low < 19, Mod (19-27), High > 27): Readmission Risk Score: 13    Current PCP: Estella Hernandez MD  PCP verified by CM? Yes    Chart Reviewed: Yes      History Provided by: Child/Family, Medical Record  Patient Orientation: Alert and Oriented, Person    Patient Cognition: Dementia / Early Alzheimer's    Hospitalization in the last 30 days (Readmission):  No    If yes, Readmission Assessment in  Navigator will be completed.    Advance Directives:      Code Status: DNR-CCA   Patient's Primary Decision Maker is: Legal Next of Kin (Pt has advance directives but they are not on file here)    Primary Decision Maker: Rebecca Ibarra - Child - [phone]    Secondary Decision Maker: oMuna Navas - Child    Discharge Planning:    Patient lives with: Alone, Other (Comment) (has 24 hour care from family and private caregivers) Type of Home: House  Primary Care Giver: Family  Patient Support Systems include: Children, Other (Comment) (private caregivers)   Current Financial resources: Medicare  Current community resources: Other (Comment) (private caregivers)  Current services prior to admission: Durable Medical Equipment, Private Duty Homecare            Current DME: Bedside Commode, Shower Chair, Walker, Wheelchair, Other (Comment) (bed alarm, ramp, and grab bars)            Type of Home Care services:  PT, OT, Nursing Services, Aide

## 2025-07-03 NOTE — PROGRESS NOTES
Tele alarms reviewed from previous shift for clearing, noted two episodes of V-tach recorded on alarms. Nai TANG notified. Son at bedside and was updated. CNP in to see pt and discuss with son. Son to let staff know if they want cardiology to see pt.

## 2025-07-04 PROBLEM — R53.1 GENERALIZED WEAKNESS: Status: ACTIVE | Noted: 2025-07-04

## 2025-07-04 PROBLEM — I47.29 NSVT (NONSUSTAINED VENTRICULAR TACHYCARDIA) (HCC): Status: ACTIVE | Noted: 2025-07-04

## 2025-07-04 PROBLEM — E86.0 DEHYDRATION: Status: ACTIVE | Noted: 2025-07-04

## 2025-07-04 LAB
ANION GAP SERPL CALCULATED.3IONS-SCNC: 12 MMOL/L (ref 9–16)
BASOPHILS # BLD: 0.05 K/UL (ref 0–0.2)
BASOPHILS NFR BLD: 1 % (ref 0–2)
BUN SERPL-MCNC: 21 MG/DL (ref 8–23)
BUN/CREAT SERPL: 19 (ref 9–20)
CALCIUM SERPL-MCNC: 8.9 MG/DL (ref 8.6–10.4)
CHLORIDE SERPL-SCNC: 105 MMOL/L (ref 98–107)
CO2 SERPL-SCNC: 20 MMOL/L (ref 20–31)
CREAT SERPL-MCNC: 1.1 MG/DL (ref 0.5–0.9)
CRP SERPL HS-MCNC: 12.1 MG/L (ref 0–5)
EKG ATRIAL RATE: 65 BPM
EKG P AXIS: 33 DEGREES
EKG P-R INTERVAL: 198 MS
EKG Q-T INTERVAL: 426 MS
EKG QRS DURATION: 116 MS
EKG QTC CALCULATION (BAZETT): 443 MS
EKG R AXIS: -63 DEGREES
EKG T AXIS: 76 DEGREES
EKG VENTRICULAR RATE: 65 BPM
EOSINOPHIL # BLD: 0.21 K/UL (ref 0–0.44)
EOSINOPHILS RELATIVE PERCENT: 4 % (ref 1–4)
ERYTHROCYTE [DISTWIDTH] IN BLOOD BY AUTOMATED COUNT: 13.4 % (ref 11.8–14.4)
GFR, ESTIMATED: 49 ML/MIN/1.73M2
GLUCOSE SERPL-MCNC: 76 MG/DL (ref 74–99)
HCT VFR BLD AUTO: 34.6 % (ref 36.3–47.1)
HGB BLD-MCNC: 11.5 G/DL (ref 11.9–15.1)
IMM GRANULOCYTES # BLD AUTO: <0.03 K/UL (ref 0–0.3)
IMM GRANULOCYTES NFR BLD: 0 %
LYMPHOCYTES NFR BLD: 1.88 K/UL (ref 1.1–3.7)
LYMPHOCYTES RELATIVE PERCENT: 33 % (ref 24–43)
MCH RBC QN AUTO: 33.6 PG (ref 25.2–33.5)
MCHC RBC AUTO-ENTMCNC: 33.2 G/DL (ref 28.4–34.8)
MCV RBC AUTO: 101.2 FL (ref 82.6–102.9)
MONOCYTES NFR BLD: 0.59 K/UL (ref 0.1–1.2)
MONOCYTES NFR BLD: 10 % (ref 3–12)
NEUTROPHILS NFR BLD: 52 % (ref 36–65)
NEUTS SEG NFR BLD: 2.97 K/UL (ref 1.5–8.1)
NRBC BLD-RTO: 0 PER 100 WBC
PLATELET # BLD AUTO: 222 K/UL (ref 138–453)
PMV BLD AUTO: 11.3 FL (ref 8.1–13.5)
POTASSIUM SERPL-SCNC: 5.3 MMOL/L (ref 3.7–5.3)
PREALB SERPL-MCNC: 20.7 MG/DL (ref 20–40)
RBC # BLD AUTO: 3.42 M/UL (ref 3.95–5.11)
SODIUM SERPL-SCNC: 137 MMOL/L (ref 136–145)
WBC OTHER # BLD: 5.7 K/UL (ref 3.5–11.3)

## 2025-07-04 PROCEDURE — 6360000002 HC RX W HCPCS: Performed by: NURSE PRACTITIONER

## 2025-07-04 PROCEDURE — 86140 C-REACTIVE PROTEIN: CPT

## 2025-07-04 PROCEDURE — 93010 ELECTROCARDIOGRAM REPORT: CPT | Performed by: INTERNAL MEDICINE

## 2025-07-04 PROCEDURE — 97116 GAIT TRAINING THERAPY: CPT

## 2025-07-04 PROCEDURE — 94761 N-INVAS EAR/PLS OXIMETRY MLT: CPT

## 2025-07-04 PROCEDURE — 6370000000 HC RX 637 (ALT 250 FOR IP): Performed by: INTERNAL MEDICINE

## 2025-07-04 PROCEDURE — 80048 BASIC METABOLIC PNL TOTAL CA: CPT

## 2025-07-04 PROCEDURE — 84134 ASSAY OF PREALBUMIN: CPT

## 2025-07-04 PROCEDURE — 94664 DEMO&/EVAL PT USE INHALER: CPT

## 2025-07-04 PROCEDURE — 36415 COLL VENOUS BLD VENIPUNCTURE: CPT

## 2025-07-04 PROCEDURE — 6370000000 HC RX 637 (ALT 250 FOR IP): Performed by: FAMILY MEDICINE

## 2025-07-04 PROCEDURE — 85025 COMPLETE CBC W/AUTO DIFF WBC: CPT

## 2025-07-04 PROCEDURE — 6370000000 HC RX 637 (ALT 250 FOR IP): Performed by: NURSE PRACTITIONER

## 2025-07-04 PROCEDURE — 93005 ELECTROCARDIOGRAM TRACING: CPT | Performed by: FAMILY MEDICINE

## 2025-07-04 PROCEDURE — 2500000003 HC RX 250 WO HCPCS: Performed by: NURSE PRACTITIONER

## 2025-07-04 PROCEDURE — 6370000000 HC RX 637 (ALT 250 FOR IP): Performed by: STUDENT IN AN ORGANIZED HEALTH CARE EDUCATION/TRAINING PROGRAM

## 2025-07-04 PROCEDURE — 97535 SELF CARE MNGMENT TRAINING: CPT

## 2025-07-04 PROCEDURE — 94669 MECHANICAL CHEST WALL OSCILL: CPT

## 2025-07-04 PROCEDURE — 94640 AIRWAY INHALATION TREATMENT: CPT

## 2025-07-04 PROCEDURE — 99222 1ST HOSP IP/OBS MODERATE 55: CPT | Performed by: FAMILY MEDICINE

## 2025-07-04 PROCEDURE — 1200000000 HC SEMI PRIVATE

## 2025-07-04 RX ORDER — ALBUTEROL SULFATE 90 UG/1
2 INHALANT RESPIRATORY (INHALATION) 4 TIMES DAILY PRN
Qty: 18 G | Refills: 0 | Status: SHIPPED | OUTPATIENT
Start: 2025-07-04

## 2025-07-04 RX ORDER — METOPROLOL SUCCINATE 25 MG/1
25 TABLET, EXTENDED RELEASE ORAL DAILY
Status: DISCONTINUED | OUTPATIENT
Start: 2025-07-04 | End: 2025-07-05 | Stop reason: HOSPADM

## 2025-07-04 RX ORDER — DOCUSATE SODIUM 100 MG/1
100 CAPSULE, LIQUID FILLED ORAL DAILY
Status: DISCONTINUED | OUTPATIENT
Start: 2025-07-04 | End: 2025-07-05 | Stop reason: HOSPADM

## 2025-07-04 RX ORDER — DOXYCYCLINE HYCLATE 100 MG
100 TABLET ORAL 2 TIMES DAILY
Qty: 14 TABLET | Refills: 0 | Status: SHIPPED | OUTPATIENT
Start: 2025-07-04 | End: 2025-07-11

## 2025-07-04 RX ADMIN — SODIUM CHLORIDE, PRESERVATIVE FREE 10 ML: 5 INJECTION INTRAVENOUS at 21:28

## 2025-07-04 RX ADMIN — ATORVASTATIN CALCIUM 10 MG: 10 TABLET, FILM COATED ORAL at 20:22

## 2025-07-04 RX ADMIN — IPRATROPIUM BROMIDE AND ALBUTEROL SULFATE 1 DOSE: .5; 3 SOLUTION RESPIRATORY (INHALATION) at 16:05

## 2025-07-04 RX ADMIN — ENOXAPARIN SODIUM 30 MG: 100 INJECTION SUBCUTANEOUS at 09:09

## 2025-07-04 RX ADMIN — CEFTRIAXONE SODIUM 1000 MG: 1 INJECTION, POWDER, FOR SOLUTION INTRAMUSCULAR; INTRAVENOUS at 15:36

## 2025-07-04 RX ADMIN — DOXYCYCLINE HYCLATE 100 MG: 100 CAPSULE ORAL at 09:04

## 2025-07-04 RX ADMIN — SODIUM CHLORIDE, PRESERVATIVE FREE 10 ML: 5 INJECTION INTRAVENOUS at 09:06

## 2025-07-04 RX ADMIN — PANTOPRAZOLE SODIUM 40 MG: 40 TABLET, DELAYED RELEASE ORAL at 09:04

## 2025-07-04 RX ADMIN — LATANOPROST 1 DROP: 50 SOLUTION/ DROPS OPHTHALMIC at 09:09

## 2025-07-04 RX ADMIN — METOPROLOL SUCCINATE 25 MG: 25 TABLET, EXTENDED RELEASE ORAL at 13:25

## 2025-07-04 RX ADMIN — IPRATROPIUM BROMIDE AND ALBUTEROL SULFATE 1 DOSE: .5; 3 SOLUTION RESPIRATORY (INHALATION) at 20:26

## 2025-07-04 RX ADMIN — IPRATROPIUM BROMIDE AND ALBUTEROL SULFATE 1 DOSE: .5; 3 SOLUTION RESPIRATORY (INHALATION) at 11:02

## 2025-07-04 RX ADMIN — QUETIAPINE FUMARATE 25 MG: 25 TABLET ORAL at 20:22

## 2025-07-04 RX ADMIN — AMLODIPINE BESYLATE 5 MG: 5 TABLET ORAL at 09:04

## 2025-07-04 RX ADMIN — DOCUSATE SODIUM 100 MG: 100 CAPSULE, LIQUID FILLED ORAL at 09:05

## 2025-07-04 RX ADMIN — TIMOLOL MALEATE 1 DROP: 2.5 SOLUTION OPHTHALMIC at 09:09

## 2025-07-04 RX ADMIN — SODIUM CHLORIDE, PRESERVATIVE FREE 10 ML: 5 INJECTION INTRAVENOUS at 15:37

## 2025-07-04 RX ADMIN — ASPIRIN 325 MG: 325 TABLET ORAL at 09:06

## 2025-07-04 RX ADMIN — LEVOTHYROXINE SODIUM 75 MCG: 75 TABLET ORAL at 09:04

## 2025-07-04 RX ADMIN — DOXYCYCLINE HYCLATE 100 MG: 100 CAPSULE ORAL at 20:22

## 2025-07-04 RX ADMIN — HYDROXYCHLOROQUINE SULFATE 200 MG: 200 TABLET ORAL at 09:04

## 2025-07-04 NOTE — PROGRESS NOTES
Patient is very restless in the chair. Family is in the room at this time. She is trying to get up and remove her telemetry and IV needing frequent redirection.

## 2025-07-04 NOTE — CONSULTS
Patient: Chato Navas  : 3/6/1932  Date of Admission: 2025  Primary Care Physician: Estella Hernandez  Today's Date: 2025    REASON FOR CONSULTATION: Fatigue (Patient from home. Nurse at home reported patient has been lethargic since 930 and had difficulty finding her radial pulse. Per EMS HR 40-50's with bp 70/40. Was given 1 of atropine and 1L of fluids. Patient is only alert to self at baseline. )      HPI:  Ms. Navas is a 89-year-old female admitted from nursing home on 25 for hypotension and altered mental status. Per EMS, she was found to have a very low blood pressure and barely palpable pulse. In the ED, imaging and clinical exam suggested a right lower lobe pneumonia. During hospitalization, she has had two brief episodes of wide-complex tachycardia: the first at 2:30 a.m. on 7/3 (13 beats at approximately 190-200 bpm) and the second at 7:16 a.m. (8 beats). Both are most consistent with non-sustained VT. No prior cardiac history or testing found in chart or Care Everywhere. Before admission, she was on aspirin and simvastatin 10 mg only.    Since admission, blood pressure has ranged from 87 systolic to 182, now 132/87 with HR 68. Troponins have remained negative. Creatinine has been stable at approximately 1.0-1.1. Hemoglobin ranged from 8.5 to 12.1, currently 11.5. No leukocytosis. EKG on admission showed a QTc of 457 ms. Inpatient medications include ondansetron (PRN), hydroxychloroquine, quetiapine, and ceftriaxone, all of which may contribute to QT prolongation.    She denied any current or recent chest pain, abdominal pain, known history of heart problems in her or her family members. Denied any shortness of breath.     Past Medical History:   Diagnosis Date    Arthritis     Cancer (HCC)     BREAST    Cataract     Dementia (HCC) 2025    Glaucoma     Hyperlipidemia     Hypertension     Hypomagnesemia 2025    Stroke (HCC)        CURRENT ALLERGIES: Lisinopril REVIEW OF  lung 07/02/2025    Hypomagnesemia 07/02/2025    Dementia (HCC) 07/02/2025    Hypertension     Primary osteoarthritis of right knee 04/15/2021       PLAN:  Assessment and Plan:    1. Non-sustained ventricular tachycardia: likely secondary to acute illness, medication effects, and physiologic stress. Recommend starting metoprolol succinate 25 mg daily.  Order transthoracic echocardiogram for Monday to assess for structural heart disease if patient still here then. Repeat EKG and monitor QTc interval.    Review and consider de-escalation of QT-prolonging medications, including quetiapine, ondansetron, and hydroxychloroquine, based on clinical need.    Community-acquired pneumonia: continue antibiotic treatment and monitor for clinical improvement.    Anemia, multifactorial: current hemoglobin is stable; no intervention needed unless further decline occurs.    Once again, thank you for allowing me to participate in this patients care. Please do not hesitate to contact me could I be of further assistance.    Sincerely,  Ta Parr MD, MS, [facility] Cardiology Specialist    [address] [place] [address]  Phone: [phone], Fax: [phone]     I believe that the risk of significant morbidity and mortality related to the patient's current medical conditions are: Intermediate.         July 4, 2025    Chato Navas is a 89 y.o. female being evaluated by a Virtual Visit (video visit) encounter to address concerns as mentioned above.  A caregiver was present when appropriate. Due to this being a TeleHealth encounter, evaluation of the following organ systems was limited: Vitals/Constitutional/EENT/Resp/CV/GI//MS/Neuro/Skin/Heme-Lymph-Imm. The patient (and/or legal guardian) has also been advised to contact this office for worsening conditions or problems, and seek emergency medical treatment and/or call 911 if deemed necessary.    Services were provided through a video synchronous

## 2025-07-04 NOTE — PROGRESS NOTES
Physical Therapy  Facility/Department: [facility] MED SURG  Daily Treatment Note  NAME: Chato Navas  : 3/6/1932  MRN: [id number]    Date of Service: 2025    Discharge Recommendations:  Continue to assess pending progress, Subacute/Skilled Nursing Facility, Long Term Care with PT, Home with Home health PT     Patient Diagnosis(es): The primary encounter diagnosis was Generalized weakness. Diagnoses of Hypomagnesemia, Dehydration, and Community acquired pneumonia of right lower lobe of lung were also pertinent to this visit.    Assessment  Assessment: Transfers:ModAx2. Pt ambulated 20ftx2 with FWW and ModAx2 for safety. VC's needed for proper hand placement for performance of safe transfers. Pt demoes slow shuffled cadance with decreased B LE step length. Pt needing VC's throughout ambulation for proper gait pattern and safety of FWW. Assistance provided for directional movement of walker. Additional time needed during ambulation. Pt completed seated B LE therex x15 in all planes of motion--demonstration needed for proper technique and posture.  Activity Tolerance: Patient tolerated treatment well;Treatment limited secondary to decreased cognition    Plan  Physical Therapy Plan  General Plan: 2 times a day 7 days a week  Current Treatment Recommendations: Strengthening;ROM;Balance training;Functional mobility training;Transfer training;ADL/Self-care training;IADL training;Manual;Neuromuscular re-education;Stair training;Gait training;Endurance training;Pain management;Home exercise program;Safety education & training;Patient/Caregiver education & training;Therapeutic activities;Positioning    Restrictions  Restrictions/Precautions  Restrictions/Precautions: Fall Risk, General Precautions  Activity Level: Up Ad Dary  Required Braces or Orthoses?: No     Subjective   Subjective  Subjective: Pt. in chair upon arrival with family present.  Pain: does not report    Objective  Bed Mobility Training  Bed Mobility  Training: No  Transfer Training  Transfer Training: Yes  Overall Level of Assistance: Partial/Moderate assistance;2 Person assistance  Interventions: Verbal cues  Sit to Stand: Partial/Moderate assistance;2 Person assistance  Stand to Sit: Partial/Moderate assistance;2 Person assistance  Gait  Gait Training: Yes  Overall Level of Assistance: Partial/Moderate assistance;2 Person assistance  Distance (ft): 40 Feet  Assistive Device: Walker, rolling;Gait belt  Interventions: Verbal cues;Tactile cues;Manual cues;Safety awareness training  Speed/Swetha: Slow;Shuffled  Step Length: Left shortened;Right shortened  PT Exercises  Exercise Treatment: seated B LE therex x15 in all planes of motion--demonstration needed for proper technique and posture  Safety Devices  Type of Devices: All fall risk precautions in place;Call light within reach;Nurse notified;Left in chair;Chair alarm in place;Gait belt      Goals  Short Term Goals  Time Frame for Short Term Goals: 10 days  Short Term Goal 1: Patient will be able to ambulate 10' with FWW, min A.  Short Term Goal 2: Patient will perform bed mobility tasks and transfers with Min A.  Short Term Goal 3: Patient will tolerate 20-30 minutes of therex/act to improve endurance for ADLs.  Patient Goals   Patient Goals : No stated goals    Education  Patient Education  Education Given To: Patient;Family  Education Provided: Role of Therapy;Plan of Care  Education Method: Verbal;Demonstration  Barriers to Learning: Cognition  Education Outcome: Continued education needed    Therapy Time   Individual Concurrent Group Co-treatment   Time In 0856         Time Out 0913         Minutes 17           Jackelyn Sr PTA

## 2025-07-04 NOTE — PROGRESS NOTES
Patient's POA, Rebecca, is visiting and is requesting a call from the doctor tomorrow about the plan for her mother. Writer will inform oncoming shift and leave a sticky note for the physician. She was also inquiring about the patient's cardiology consult, which was called just before shift change. Writer has no information on that at this time but told Rebecca it would be something to ask about tomorrow either when she speaks with the doctor or the nurse.

## 2025-07-04 NOTE — FLOWSHEET NOTE
Patients daughter Rebecca aware of no discharge today. States she will be in later tonight to see her mother

## 2025-07-04 NOTE — PLAN OF CARE
Problem: Chronic Conditions and Co-morbidities  Goal: Patient's chronic conditions and co-morbidity symptoms are monitored and maintained or improved  Outcome: Progressing  Flowsheets (Taken 7/3/2025 1815)  Care Plan - Patient's Chronic Conditions and Co-Morbidity Symptoms are Monitored and Maintained or Improved:   Monitor and assess patient's chronic conditions and comorbid symptoms for stability, deterioration, or improvement   Collaborate with multidisciplinary team to address chronic and comorbid conditions and prevent exacerbation or deterioration   Update acute care plan with appropriate goals if chronic or comorbid symptoms are exacerbated and prevent overall improvement and discharge     Problem: Discharge Planning  Goal: Discharge to home or other facility with appropriate resources  Outcome: Progressing  Flowsheets (Taken 7/3/2025 1815)  Discharge to home or other facility with appropriate resources:   Identify barriers to discharge with patient and caregiver   Arrange for needed discharge resources and transportation as appropriate   Identify discharge learning needs (meds, wound care, etc)   Refer to discharge planning if patient needs post-hospital services based on physician order or complex needs related to functional status, cognitive ability or social support system     Problem: ABCDS Injury Assessment  Goal: Absence of physical injury  Outcome: Progressing  Flowsheets (Taken 7/3/2025 2214)  Absence of Physical Injury: Implement safety measures based on patient assessment     Problem: Confusion  Goal: Confusion, delirium, dementia, or psychosis is improved or at baseline  Description: INTERVENTIONS:  1. Assess for possible contributors to thought disturbance, including medications, impaired vision or hearing, underlying metabolic abnormalities, dehydration, psychiatric diagnoses, and notify attending LIP  2. [place] high risk fall precautions, as indicated  3. Provide frequent short contacts to

## 2025-07-04 NOTE — FLOWSHEET NOTE
Awake, sitting up in recliner at bedside. Oriented to name only. Vitals checked and assessment completed. Denies pain this morning. Family at bedside. No needs at present time. Call light within reach. Care ongoing.

## 2025-07-04 NOTE — PROGRESS NOTES
[address] , [place], [place], [address]    Progress Note    Date:   7/4/2025  Patient name:  Chato Navas  Date of admission:  7/2/2025 10:41 AM  MRN:   [id number]  YOB: 1932    SUBJECTIVE/Last 24 hours update:     Patient seen and examined at the bed side , no new acute events overnight except for ongoing restlessness and, no new complains noted. Notes from nursing staff and Consults had been reviewed, and the overnight progress had been checked with the nursing staff as well.      REVIEW OF SYSTEMS:      CONSTITUTIONAL:  no fevers, no headcahes  EYES: negative for blury vision  HEENT: No headaches, No nasal congestion, no difficulty swallowing  RESPIRATORY:negative for dyspnea, no wheezing, no Cough  CARDIOVASCULAR: negative for chest pain, no palpitations  GASTROINTESTINAL: no nausea, no vomiting, no change in bowel habits, no abdominal pain   GENITOURINARY: negative for dysuria, no hematuria   MUSCULOSKELETAL: no joint pains, no muscle aches, no swelling of joints or extremities, some hip pain  NEUROLOGICAL: No  Weakness or numbness      PAST MEDICAL HISTORY:      has a past medical history of Arthritis, Cancer (HCC), Cataract, Dementia (HCC), Glaucoma, Hyperlipidemia, Hypertension, Hypomagnesemia, and Stroke (HCC).    PAST SURGICAL HISTORY:      has a past surgical history that includes shoulder surgery; Breast lumpectomy (Right); and Cataract removal (Bilateral).       SOCIAL HISTORY:      reports that she has never smoked. She has never used smokeless tobacco. She reports current alcohol use. She reports that she does not use drugs.    TOBACCO:   reports that she has never smoked. She has never used smokeless tobacco.  ETOH:   reports current alcohol use.  Reviewed and non-contributory or as noted above and/or in the HPI    FAMILY HISTORY:     family history includes Cancer in her brother and mother; Deep Vein Thrombosis in her mother; Diabetes in her father; Heart  mg at 07/03/25 2044    sodium chloride flush 0.9 % injection 5-40 mL  5-40 mL IntraVENous 2 times per day Nai Boston APRN - CNP   10 mL at 07/03/25 2044    sodium chloride flush 0.9 % injection 5-40 mL  5-40 mL IntraVENous PRN Nai Boston APRN - CNP        0.9 % sodium chloride infusion   IntraVENous PRN Nai Boston APRN - CNP        enoxaparin Sodium (LOVENOX) injection 30 mg  30 mg SubCUTAneous Daily Nai Boston APRN - CNP   30 mg at 07/03/25 0817    ondansetron (ZOFRAN-ODT) disintegrating tablet 4 mg  4 mg Oral Q8H PRN Nai Boston APRN - CNP        Or    ondansetron (ZOFRAN) injection 4 mg  4 mg IntraVENous Q6H PRN Nai Boston APRN - CNP        cefTRIAXone (ROCEPHIN) 1,000 mg in sterile water 10 mL IV syringe  1,000 mg IntraVENous Q24H Nai Boston APRN - CNP   1,000 mg at 07/03/25 1620    And    doxycycline hyclate (VIBRAMYCIN) capsule 100 mg  100 mg Oral 2 times per day Nai Boston APRN - CNP   100 mg at 07/03/25 2044    ipratropium 0.5 mg-albuterol 2.5 mg (DUONEB) nebulizer solution 1 Dose  1 Dose Inhalation TID Alfredo Galeano MD   1 Dose at 07/03/25 1559    albuterol (PROVENTIL) (2.5 MG/3ML) 0.083% nebulizer solution 2.5 mg  2.5 mg Nebulization Q4H PRN Alfredo Galeano MD           ASSESSMENT:     Principal Problem:    Community acquired pneumonia of right lower lobe of lung  Active Problems:    Hypomagnesemia    Dementia (HCC)    Hypertension  Resolved Problems:    * No resolved hospital problems. *      PLAN:     Primary Problem(s): Community acquired pneumonia of right lower lobe of lung  Condition is stable  Treatment plan: Continue current treatment  Imaging: no further imaging studies ordered today  Medications: Continue current meds  Medication Monitoring / High Risk Medications: none   Cardiology had been consulted  Ceftriaxone/Doxycycline  Breathing treatments  PT/OT  Dispo

## 2025-07-04 NOTE — PROGRESS NOTES
Vitals and assessment were completed at this time. Writer walked patient through the medications that would be administered tonight and encouraged patient to ask questions about the medications and therapies. Patient remains only alert to self but agrees to take her medications for the night.   She removed her telemetry and told writer she had to use the restroom, so writer and second staff assisted patient to the [facility]. She tolerated fairly well and followed directions with a moderate amount of direction.   Patient was assisted back into the bed and repositioned for comfort.   Call light and bedside table remain within reach. Patient denies needs at this time. Care ongoing.

## 2025-07-04 NOTE — PROGRESS NOTES
RESPIRATORY ASSESSMENT PROTOCOL                                                                                              Patient Name: Chato Navas Room#: 0316/0316-01 : 3/6/1932     Admitting diagnosis: Dehydration [E86.0]  Hypomagnesemia [E83.42]  Generalized weakness [R53.1]  Community acquired pneumonia of right lower lobe of lung [J18.9]       Medical History:   Past Medical History:   Diagnosis Date    Arthritis     Cancer (HCC)     BREAST    Cataract     Dementia (HCC) 2025    Glaucoma     Hyperlipidemia     Hypertension     Hypomagnesemia 2025    Stroke (HCC)        PATIENT ASSESSMENT    LABORATORY DATA  Hematology:   Lab Results   Component Value Date/Time    WBC 5.7 2025 05:05 AM    RBC 3.42 2025 05:05 AM    HGB 11.5 2025 05:05 AM    HCT 34.6 2025 05:05 AM     2025 05:05 AM     Chemistry:  No results found for: \"PHART\", \"FBU0PJB\", \"PO2ART\", \"R4XPIUBN\", \"MWM8DZM\", \"PBEA\", \"NBEA\"    VITALS  Pulse: 65   Respirations: 18  BP: (!) 150/86  SpO2: 98 % O2 Device: None (Room air)  Temp: 98 °F (36.7 °C)    SKIN COLOR  [x] Normal  [] Pale  [] Dusky  [] Cyanotic    RESPIRATORY PATTERN  [x] Normal  [] Dyspnea  [] Cheyne-Fernandes  [] Kussmaul  [] Biots    AMBULATORY  [] Yes  [] No  [x] With Assistance            Patient Acuity 0 1 2 3 4 Score   Level of Consciousness (LOC) []  Alert & Oriented or Pt normal LOC [x]  Confused;follows directions []  Confused & uncooper-ative []  Obtunded []  Comatose 1   Respiratory Rate  (RR) [x]  Reg. rate & pattern. 12 - 20 bpm  []  Increased RR. Greater than 20 bpm   []  SOB w/ exertion or RR greater than 24 bpm []  Access- ory muscle use at rest. Abn.  resp. []  SOB at rest.   0   Bilateral Breath Sounds (BBS) []  Clear [x]  Diminish-ed bases  []  Diminish-ed t/o, or rales   []  Sporadic, scattered wheezes or rhonchi []  Persistentwheezes and, or absent BBS 1   Cough [x]  Strong, effective, & non-prod. []  Effective & prod. Less

## 2025-07-04 NOTE — FLOWSHEET NOTE
On BSC with 2 assist. Unsteady on feet. Returned to bed. HOB up. Call light within reach. Bed alarm on for safety

## 2025-07-04 NOTE — PROGRESS NOTES
Occupational Therapy  Facility/Department: [facility] MED SURG  Daily Treatment Note  NAME: Chato Navas  : 3/6/1932  MRN: [id number]    Date of Service: 2025    Discharge Recommendations:  Continue to assess pending progress, Subacute/Skilled Nursing Facility         Patient Diagnosis(es): The primary encounter diagnosis was Generalized weakness. Diagnoses of Hypomagnesemia, Dehydration, and Community acquired pneumonia of right lower lobe of lung were also pertinent to this visit.     Assessment   Activity Tolerance: Patient tolerated treatment well;Treatment limited secondary to decreased cognition  Discharge Recommendations: Continue to assess pending progress;Subacute/Skilled Nursing Facility     Plan  Occupational Therapy Plan  Times Per Day: Once a day  Days Per Week: 7 Days  Current Treatment Recommendations: Strengthening;ROM;Balance training;Functional mobility training;Endurance training;Safety education & training;Equipment evaluation, education, & procurement;Self-Care / ADL;Patient/Caregiver education & training    Restrictions  Restrictions/Precautions  Restrictions/Precautions: Fall Risk;General Precautions    Subjective  Subjective  Subjective: Pt sitting up in bedside chair upon arrival. Pt agreed to participate in therapy session.  Pain: Pt had no complaints of pain.         Objective  Vitals          ADL  Functional Mobility: Moderate assistance  Functional Mobility Skilled Clinical Factors: Mod X 2 to complete func mob with RW with verbal and tactile cues for safety  Additional Comments: Mod A x 2 to complete sit to stand from recliner.  Product Used : Bath wipes;Soap and water  OT Exercises  Exercise Treatment: Pt completed BUETher ex x 4 planes x 15 reps x 1 set to increase UE strength and endurance in order to ease completion of ADL tasks. Pt required RBs as needed secondary to fatigue.     Safety Devices  Type of Devices: All fall risk precautions in place;Call light within reach;Nurse

## 2025-07-05 VITALS
TEMPERATURE: 96.9 F | OXYGEN SATURATION: 97 % | SYSTOLIC BLOOD PRESSURE: 147 MMHG | HEART RATE: 56 BPM | DIASTOLIC BLOOD PRESSURE: 79 MMHG | HEIGHT: 60 IN | RESPIRATION RATE: 18 BRPM | BODY MASS INDEX: 22.07 KG/M2 | WEIGHT: 112.43 LBS

## 2025-07-05 LAB
ANION GAP SERPL CALCULATED.3IONS-SCNC: 11 MMOL/L (ref 9–16)
BASOPHILS # BLD: 0.05 K/UL (ref 0–0.2)
BASOPHILS NFR BLD: 1 % (ref 0–2)
BUN SERPL-MCNC: 19 MG/DL (ref 8–23)
BUN/CREAT SERPL: 19 (ref 9–20)
CALCIUM SERPL-MCNC: 8.7 MG/DL (ref 8.6–10.4)
CHLORIDE SERPL-SCNC: 107 MMOL/L (ref 98–107)
CO2 SERPL-SCNC: 23 MMOL/L (ref 20–31)
CREAT SERPL-MCNC: 1 MG/DL (ref 0.5–0.9)
EOSINOPHIL # BLD: 0.21 K/UL (ref 0–0.44)
EOSINOPHILS RELATIVE PERCENT: 4 % (ref 1–4)
ERYTHROCYTE [DISTWIDTH] IN BLOOD BY AUTOMATED COUNT: 13.3 % (ref 11.8–14.4)
GFR, ESTIMATED: 55 ML/MIN/1.73M2
GLUCOSE SERPL-MCNC: 87 MG/DL (ref 74–99)
HCT VFR BLD AUTO: 35.3 % (ref 36.3–47.1)
HGB BLD-MCNC: 11.8 G/DL (ref 11.9–15.1)
IMM GRANULOCYTES # BLD AUTO: <0.03 K/UL (ref 0–0.3)
IMM GRANULOCYTES NFR BLD: 0 %
LYMPHOCYTES NFR BLD: 1.58 K/UL (ref 1.1–3.7)
LYMPHOCYTES RELATIVE PERCENT: 29 % (ref 24–43)
MCH RBC QN AUTO: 33.2 PG (ref 25.2–33.5)
MCHC RBC AUTO-ENTMCNC: 33.4 G/DL (ref 28.4–34.8)
MCV RBC AUTO: 99.4 FL (ref 82.6–102.9)
MONOCYTES NFR BLD: 0.57 K/UL (ref 0.1–1.2)
MONOCYTES NFR BLD: 11 % (ref 3–12)
NEUTROPHILS NFR BLD: 55 % (ref 36–65)
NEUTS SEG NFR BLD: 3 K/UL (ref 1.5–8.1)
NRBC BLD-RTO: 0 PER 100 WBC
PLATELET # BLD AUTO: 213 K/UL (ref 138–453)
PMV BLD AUTO: 10.8 FL (ref 8.1–13.5)
POTASSIUM SERPL-SCNC: 3.7 MMOL/L (ref 3.7–5.3)
RBC # BLD AUTO: 3.55 M/UL (ref 3.95–5.11)
SODIUM SERPL-SCNC: 141 MMOL/L (ref 136–145)
WBC OTHER # BLD: 5.4 K/UL (ref 3.5–11.3)

## 2025-07-05 PROCEDURE — 2500000003 HC RX 250 WO HCPCS: Performed by: NURSE PRACTITIONER

## 2025-07-05 PROCEDURE — 97110 THERAPEUTIC EXERCISES: CPT

## 2025-07-05 PROCEDURE — 6370000000 HC RX 637 (ALT 250 FOR IP): Performed by: STUDENT IN AN ORGANIZED HEALTH CARE EDUCATION/TRAINING PROGRAM

## 2025-07-05 PROCEDURE — 85025 COMPLETE CBC W/AUTO DIFF WBC: CPT

## 2025-07-05 PROCEDURE — 80048 BASIC METABOLIC PNL TOTAL CA: CPT

## 2025-07-05 PROCEDURE — 6370000000 HC RX 637 (ALT 250 FOR IP): Performed by: NURSE PRACTITIONER

## 2025-07-05 PROCEDURE — 94761 N-INVAS EAR/PLS OXIMETRY MLT: CPT

## 2025-07-05 PROCEDURE — 6370000000 HC RX 637 (ALT 250 FOR IP): Performed by: INTERNAL MEDICINE

## 2025-07-05 PROCEDURE — 6370000000 HC RX 637 (ALT 250 FOR IP): Performed by: FAMILY MEDICINE

## 2025-07-05 PROCEDURE — 6360000002 HC RX W HCPCS: Performed by: NURSE PRACTITIONER

## 2025-07-05 PROCEDURE — 36415 COLL VENOUS BLD VENIPUNCTURE: CPT

## 2025-07-05 PROCEDURE — 94669 MECHANICAL CHEST WALL OSCILL: CPT

## 2025-07-05 PROCEDURE — 97116 GAIT TRAINING THERAPY: CPT

## 2025-07-05 PROCEDURE — 94640 AIRWAY INHALATION TREATMENT: CPT

## 2025-07-05 PROCEDURE — 97535 SELF CARE MNGMENT TRAINING: CPT

## 2025-07-05 RX ORDER — METOPROLOL SUCCINATE 25 MG/1
25 TABLET, EXTENDED RELEASE ORAL DAILY
Qty: 30 TABLET | Refills: 0 | Status: SHIPPED | OUTPATIENT
Start: 2025-07-05

## 2025-07-05 RX ADMIN — TIMOLOL MALEATE 1 DROP: 2.5 SOLUTION OPHTHALMIC at 09:10

## 2025-07-05 RX ADMIN — AMLODIPINE BESYLATE 5 MG: 5 TABLET ORAL at 09:09

## 2025-07-05 RX ADMIN — ASPIRIN 325 MG: 325 TABLET ORAL at 09:08

## 2025-07-05 RX ADMIN — LEVOTHYROXINE SODIUM 75 MCG: 75 TABLET ORAL at 09:09

## 2025-07-05 RX ADMIN — HYDROXYCHLOROQUINE SULFATE 200 MG: 200 TABLET ORAL at 09:08

## 2025-07-05 RX ADMIN — METOPROLOL SUCCINATE 25 MG: 25 TABLET, EXTENDED RELEASE ORAL at 09:08

## 2025-07-05 RX ADMIN — DOXYCYCLINE HYCLATE 100 MG: 100 CAPSULE ORAL at 09:08

## 2025-07-05 RX ADMIN — SODIUM CHLORIDE, PRESERVATIVE FREE 10 ML: 5 INJECTION INTRAVENOUS at 09:09

## 2025-07-05 RX ADMIN — PANTOPRAZOLE SODIUM 40 MG: 40 TABLET, DELAYED RELEASE ORAL at 09:09

## 2025-07-05 RX ADMIN — LATANOPROST 1 DROP: 50 SOLUTION/ DROPS OPHTHALMIC at 09:10

## 2025-07-05 RX ADMIN — IPRATROPIUM BROMIDE AND ALBUTEROL SULFATE 1 DOSE: .5; 3 SOLUTION RESPIRATORY (INHALATION) at 10:59

## 2025-07-05 RX ADMIN — DOCUSATE SODIUM 100 MG: 100 CAPSULE, LIQUID FILLED ORAL at 09:09

## 2025-07-05 NOTE — CARE COORDINATION
07/05/25 0945   IMM Letter   IMM Letter given to Patient/Family/Significant other/Guardian/POA/by: Rebecca Ventura/ CATHRYN ASHLEY   IMM Letter date given: 07/05/25   IMM Letter time given: 0934     IMM letter provided to patient.  Patient offered four hours to make informed decision regarding appeal process; patient agreeable to discharge.      Home with [facility].  GABI Rose

## 2025-07-05 NOTE — PROGRESS NOTES
Vitals and assessment complete. See flowsheets for details. Patient is resting in bed. No distress noted. Patient denies pain. Asymptomatic hypertension noted. Breathing is regular and unlabored. Lung sounds are diminished. Family is at bedside. Patient denies further needs. Call light is within reach. Care ongoing.

## 2025-07-05 NOTE — PROGRESS NOTES
[address] , [place], [place], [address]    Progress Note    Date:   7/5/2025  Patient name:  Chato Navas  Date of admission:  7/2/2025 10:41 AM  MRN:   [id number]  YOB: 1932    SUBJECTIVE/Last 24 hours update:     Patient seen and examined at the bed side , no new acute events overnight and, no new complains noted. VSS, afebrile, noted some hypertension. Case/POC had been discussed yesterday at the bedside yesterday in detail and this AM .Discussed my concerns with family, noted that she has a lot of support at home to take care of herself. Notes from nursing staff and Consults had been reviewed, and the overnight progress had been checked with the nursing staff as well. Overall she is feeling better.    REVIEW OF SYSTEMS:      CONSTITUTIONAL:  no fevers, no headcahes  EYES: negative for blury vision  HEENT: No headaches, No nasal congestion, no difficulty swallowing  RESPIRATORY:negative for dyspnea, no wheezing, no Cough  CARDIOVASCULAR: negative for chest pain, no palpitations  GASTROINTESTINAL: no nausea, no vomiting, no change in bowel habits, no abdominal pain   GENITOURINARY: negative for dysuria, no hematuria   MUSCULOSKELETAL: no joint pains, no muscle aches, no swelling of joints or extremities, some hip pain  NEUROLOGICAL: No  Weakness or numbness      PAST MEDICAL HISTORY:      has a past medical history of Arthritis, Cancer (HCC), Cataract, Dementia (HCC), Glaucoma, Hyperlipidemia, Hypertension, Hypomagnesemia, and Stroke (HCC).    PAST SURGICAL HISTORY:      has a past surgical history that includes shoulder surgery; Breast lumpectomy (Right); and Cataract removal (Bilateral).       SOCIAL HISTORY:      reports that she has never smoked. She has never used smokeless tobacco. She reports current alcohol use. She reports that she does not use drugs.    TOBACCO:   reports that she has never smoked. She has never used smokeless tobacco.  ETOH:   reports current  albuterol (PROVENTIL) (2.5 MG/3ML) 0.083% nebulizer solution 2.5 mg  2.5 mg Nebulization Q4H PRN Alfredo Galeano MD           ASSESSMENT:     Principal Problem:    Community acquired pneumonia of right lower lobe of lung  Active Problems:    NSVT (nonsustained ventricular tachycardia) (HCC)    Generalized weakness    Dehydration    Hypomagnesemia    Dementia (HCC)    Hypertension  Resolved Problems:    * No resolved hospital problems. *      PLAN:     Primary Problem(s): Community acquired pneumonia of right lower lobe of lung  Condition is stable  Treatment plan: Continue current treatment  Imaging: no further imaging studies ordered today  Medications: Continue current meds  Medication Monitoring / High Risk Medications: none   Cardiology had been consulted  Ceftriaxone/Doxycycline  Breathing treatments  PT/OT  Dispo pending    DVT prophylaxis: as ordered  GI prophylaxis: as ordered    Above plan discussed with the patient who agreed to the above plan     Discussed care plan with nurse after getting their input.    Please note that this chart was generated using voice recognition Dragon dictation software. Although every effort was made to ensure the accuracy of this automated transcription, some errors in transcription may have occurred.    Shaheed Galvin MD  7/5/2025 8:01 AM

## 2025-07-05 NOTE — PROGRESS NOTES
Spiritual Health History and Assessment/Progress Note  [facility]    (P) Initial Encounter,  ,  ,      Name: Chato Navas MRN: [id number]    Age: 89 y.o.     Sex: female   Language: English   Confucianist: Yarsanism   Community acquired pneumonia of right lower lobe of lung     Date: 7/5/2025            Total Time Calculated: (P) 20 min              [facility]        Referral/Consult From: (P) Nurse   Encounter Overview/Reason: (P) Initial Encounter  Service Provided For: (P) Patient, Family    Светлана, Belief, Meaning:   Patient identifies as spiritual  Family/Friends identify as spiritual      Importance and Influence:  Patient has spiritual/personal beliefs that influence decisions regarding their health  Family/Friends have spiritual/personal beliefs that influence decisions regarding the patient's health    Community:  Patient is connected with a spiritual community  Family/Friends are connected with a spiritual community:    Assessment and Plan of Care:     Patient Interventions include: Facilitated expression of thoughts and feelings. Prayer; Provided scripture cards and prayer cards.   Family/Friends Interventions include: Facilitated expression of thoughts and feelings    Patient Plan of Care: No spiritual needs identified for follow-up  Family/Friends Plan of Care: No spiritual needs identified for follow-up    Electronically signed by Chaplain Blue on 7/5/2025 at 8:56 AM

## 2025-07-05 NOTE — PROGRESS NOTES
Physical Therapy  Facility/Department: [facility] MED SURG  Daily Treatment Note  NAME: Chato Navas  : 3/6/1932  MRN: [id number]    Date of Service: 2025    Discharge Recommendations:  Continue to assess pending progress, Subacute/Skilled Nursing Facility, Long Term Care with PT, Home with Home health PT     Patient Diagnosis(es): The primary encounter diagnosis was Generalized weakness. Diagnoses of Hypomagnesemia, Dehydration, Community acquired pneumonia of right lower lobe of lung, Shortness of breath, NSVT (nonsustained ventricular tachycardia) (HCC), and Heart murmur were also pertinent to this visit.    Assessment  Assessment: Transfers:MinAx2. Pt ambulated 15ftx3 with FWW and MinAx2 for safety. Commode use and transfer--MinAx2. VC's needed for proper hand placement for performance of safe transfers. Pt demoes slow shuffled cadance with decreased B LE step length. Pt needing VC's throughout ambulation for proper gait pattern and safety of FWW. Assistance provided for directional movement of walker. Additional time needed during ambulation. Pt completed reclined seated B LE therex x15 in all planes of motion--demonstration needed for proper technique and posture.  Activity Tolerance: Patient tolerated treatment well;Treatment limited secondary to decreased cognition    Plan  Physical Therapy Plan  General Plan: 2 times a day 7 days a week  Current Treatment Recommendations: Strengthening;ROM;Balance training;Functional mobility training;Transfer training;ADL/Self-care training;IADL training;Manual;Neuromuscular re-education;Stair training;Gait training;Endurance training;Pain management;Home exercise program;Safety education & training;Patient/Caregiver education & training;Therapeutic activities;Positioning    Restrictions  Restrictions/Precautions  Restrictions/Precautions: Fall Risk, General Precautions  Activity Level: Up Ad Dary  Required Braces or Orthoses?: No     Subjective

## 2025-07-05 NOTE — PROGRESS NOTES
Occupational Therapy  Facility/Department: [facility] MED SURG  Daily Treatment Note  NAME: Chato Navas  : 3/6/1932  MRN: [id number]    Date of Service: 2025    Discharge Recommendations:  Continue to assess pending progress, Subacute/Skilled Nursing Facility         Patient Diagnosis(es): The primary encounter diagnosis was Generalized weakness. Diagnoses of Hypomagnesemia, Dehydration, Community acquired pneumonia of right lower lobe of lung, Shortness of breath, NSVT (nonsustained ventricular tachycardia) (HCC), and Heart murmur were also pertinent to this visit.     Assessment   Activity Tolerance: Patient tolerated treatment well;Treatment limited secondary to decreased cognition  Discharge Recommendations: Continue to assess pending progress;Subacute/Skilled Nursing Facility     Plan  Occupational Therapy Plan  Times Per Day: Once a day  Days Per Week: 7 Days  Current Treatment Recommendations: Strengthening;ROM;Balance training;Functional mobility training;Endurance training;Safety education & training;Equipment evaluation, education, & procurement;Self-Care / ADL;Patient/Caregiver education & training    Restrictions  Restrictions/Precautions  Restrictions/Precautions: Fall Risk;General Precautions    Subjective  Subjective  Subjective: Pt sitting up in bedside chair upon arrival. Pt agreed to participate in therapy session.  Pain: Pt had no complaints of pain.          Objective  Vitals           ADL  Toileting: Minimal assistance  Toileting Skilled Clinical Factors: MIn A to complete toilet hygiene and clothing mgmt.  Functional Mobility: Minimal assistance  Functional Mobility Skilled Clinical Factors: Min A X 2 to complete func mob with RW with verbal and tactile cues for safety  Additional Comments: Min A x 2 to complete sit to stand from recliner.  Product Used : Bath wipes  OT Exercises  Exercise Treatment: Pt completed BUETher ex x 4 planes x 15 reps x 1 set to increase UE strength and endurance

## 2025-07-05 NOTE — PLAN OF CARE
Problem: Chronic Conditions and Co-morbidities  Goal: Patient's chronic conditions and co-morbidity symptoms are monitored and maintained or improved  Outcome: Completed     Problem: Discharge Planning  Goal: Discharge to home or other facility with appropriate resources  Outcome: Completed     Problem: ABCDS Injury Assessment  Goal: Absence of physical injury  Outcome: Completed     Problem: Confusion  Goal: Confusion, delirium, dementia, or psychosis is improved or at baseline  Description: INTERVENTIONS:  1. Assess for possible contributors to thought disturbance, including medications, impaired vision or hearing, underlying metabolic abnormalities, dehydration, psychiatric diagnoses, and notify attending LIP  2. [place] high risk fall precautions, as indicated  3. Provide frequent short contacts to provide reality reorientation, refocusing and direction  4. Decrease environmental stimuli, including noise as appropriate  5. Monitor and intervene to maintain adequate nutrition, hydration, elimination, sleep and activity  6. If unable to ensure safety without constant attention obtain sitter and review sitter guidelines with assigned personnel  7. Initiate Psychosocial CNS and Spiritual Care consult, as indicated  Outcome: Completed     Problem: Risk for Elopement  Goal: Patient will not exit the unit/facility without proper excort  Outcome: Completed     Problem: Skin/Tissue Integrity  Goal: Skin integrity remains intact  Description: 1.  Monitor for areas of redness and/or skin breakdown  2.  Assess vascular access sites hourly  3.  Every 4-6 hours minimum:  Change oxygen saturation probe site  4.  Every 4-6 hours:  If on nasal continuous positive airway pressure, respiratory therapy assess nares and determine need for appliance change or resting period  Outcome: Completed     Problem: Safety - Adult  Goal: Free from fall injury  Outcome: Completed     Problem: Pain  Goal: Verbalizes/displays adequate comfort

## 2025-07-05 NOTE — DISCHARGE INSTR - DIET
Good nutrition is important when healing from an illness, injury, or surgery.  Follow any nutrition recommendations given to you during your hospital stay.   If you were given an oral nutrition supplement while in the hospital, continue to take this supplement at home.  You can take it with meals, in-between meals, and/or before bedtime. These supplements can be purchased at most local grocery stores, pharmacies, and chain [employer]-stores.   If you have any questions about your diet or nutrition, call the hospital and ask for the dietitian.    Cardiac diet/low sodium diet

## 2025-07-05 NOTE — DISCHARGE SUMMARY
[address] , [place], [place], [address]    Discharge Summary      NAME:  Chato Navas  :  3/6/1932  MRN:  [id number]    Admit date:  2025  Discharge date:  25      Admitting Physician:  Alfredo Galeano MD    Primary Diagnosis on Admission:   Present on Admission:   Community acquired pneumonia of right lower lobe of lung   Hypomagnesemia   Dementia (HCC)   Hypertension   NSVT (nonsustained ventricular tachycardia) (HCC)   Generalized weakness   Dehydration      Secondary Diagnoses:  has NSVT (nonsustained ventricular tachycardia) (HCC); Generalized weakness; and Dehydration on their pertinent problem list.    Admission Condition:  serious     Discharged Condition: stable    Hospital Course:   The patient was admitted for the management of fatigue with associated low Bps and low heart rate. Labs and imaging had been obtained which were concerning for pneumonia and she was empirically started on IV Doxycycline/Ceftriaxone. Cardiology was also consulted. She did have some Cardiac Arrhythmia and it was recommended to start a beta-blocker and obtain an ECHO. Goals of care had been discussed with family which noted that they would not want to pursue any additional w/u at this time. RBA had been discussed. I provided them with the option to follow-up with Cardiology and/or obtain an ECHO should they chose to. The patient/family would like to be discharged home with close OP follow-up. Vitals and Labs are stable. The patient will consider getting repeat labs/imaging as ordered. She will continue with Doxycycline and Albuterol PRN. Discussed RBA of Toprol in the context of Bradycardia /Vtach and the patient/family will think about it.    If there are any worsening or concerning signs or symptoms, patient will report to the ED and/or contact [id number] for immediate evaluation. Teach back method was used. All patient questions answered. Pt voiced understanding.     Consults:

## 2025-07-05 NOTE — PROGRESS NOTES
Patient discharged at this time. Patient left floor via wheelchair and to be discharged home with family.

## 2025-07-06 LAB
MICROORGANISM SPEC CULT: NORMAL
MICROORGANISM SPEC CULT: NORMAL
SERVICE CMNT-IMP: NORMAL
SERVICE CMNT-IMP: NORMAL
SPECIMEN DESCRIPTION: NORMAL
SPECIMEN DESCRIPTION: NORMAL
